# Patient Record
Sex: MALE | Race: WHITE | NOT HISPANIC OR LATINO
[De-identification: names, ages, dates, MRNs, and addresses within clinical notes are randomized per-mention and may not be internally consistent; named-entity substitution may affect disease eponyms.]

---

## 2019-12-26 ENCOUNTER — APPOINTMENT (OUTPATIENT)
Dept: ORTHOPEDIC SURGERY | Facility: CLINIC | Age: 59
End: 2019-12-26
Payer: COMMERCIAL

## 2019-12-26 VITALS — WEIGHT: 188 LBS | BODY MASS INDEX: 27.85 KG/M2 | HEIGHT: 69 IN

## 2019-12-26 DIAGNOSIS — Z78.9 OTHER SPECIFIED HEALTH STATUS: ICD-10-CM

## 2019-12-26 DIAGNOSIS — S49.92XA UNSPECIFIED INJURY OF LEFT SHOULDER AND UPPER ARM, INITIAL ENCOUNTER: ICD-10-CM

## 2019-12-26 DIAGNOSIS — Z82.61 FAMILY HISTORY OF ARTHRITIS: ICD-10-CM

## 2019-12-26 DIAGNOSIS — Z80.9 FAMILY HISTORY OF MALIGNANT NEOPLASM, UNSPECIFIED: ICD-10-CM

## 2019-12-26 PROCEDURE — 73030 X-RAY EXAM OF SHOULDER: CPT | Mod: LT

## 2019-12-26 PROCEDURE — 99215 OFFICE O/P EST HI 40 MIN: CPT | Mod: 25

## 2019-12-26 PROCEDURE — 20610 DRAIN/INJ JOINT/BURSA W/O US: CPT | Mod: LT

## 2019-12-26 RX ORDER — BUPROPION HYDROCHLORIDE 300 MG/1
300 TABLET, EXTENDED RELEASE ORAL
Refills: 0 | Status: ACTIVE | COMMUNITY

## 2019-12-26 RX ORDER — BUPROPION HYDROCHLORIDE 300 MG/1
300 TABLET, EXTENDED RELEASE ORAL
Qty: 90 | Refills: 0 | Status: ACTIVE | COMMUNITY
Start: 2019-10-28

## 2019-12-26 NOTE — ASSESSMENT
[FreeTextEntry1] : 59 year old male with left shoulder injury after a fall down the stairs.  He may have subluxed his shoulder at that time.  It is likely he has a rotator cuff injury with significant weakness on exam.  He was given a corticosteroid injection.   He will remain in the sling.  He will obtain a MRI of his left shoulder.  He can take anti-inflammatories as needed for pain.  He will followup once his MRI has been completed.  He knows to call with any questions or concerns or if his symptoms acutely worsen.

## 2019-12-26 NOTE — HISTORY OF PRESENT ILLNESS
[de-identified] : 59 year old male had a mechanical fall down his apartment stairs on 12/25.  He had left shoulder pain and weakness.  He is unable to lift his left arm up without significant pain or weakness.  He had radiographs at [facility] which were negative for fracture.  He is unsure if he felt his shoulder dislocation. He had one injury to his left shoulder in the past. 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  He had no symptoms in his left shoulder up until this latest injury.

## 2020-01-10 ENCOUNTER — APPOINTMENT (OUTPATIENT)
Dept: ORTHOPEDIC SURGERY | Facility: CLINIC | Age: 60
End: 2020-01-10
Payer: COMMERCIAL

## 2020-01-10 VITALS — WEIGHT: 188 LBS | HEIGHT: 69 IN | BODY MASS INDEX: 27.85 KG/M2

## 2020-01-10 DIAGNOSIS — S53.402A UNSPECIFIED SPRAIN OF LEFT ELBOW, INITIAL ENCOUNTER: ICD-10-CM

## 2020-01-10 DIAGNOSIS — S50.02XA CONTUSION OF LEFT ELBOW, INITIAL ENCOUNTER: ICD-10-CM

## 2020-01-10 PROCEDURE — 99214 OFFICE O/P EST MOD 30 MIN: CPT

## 2020-01-10 PROCEDURE — 73070 X-RAY EXAM OF ELBOW: CPT | Mod: LT

## 2020-01-10 NOTE — PHYSICAL EXAM
[de-identified] : XRAY LEFT ELBOW  - \par AVULSION OF CORONOID PROCESS C/W ANTERIOR CAPSULAR SPRAIN \par NO OBVIOUS FRACTURE OR DISLOCATION  [de-identified] : PHYSICAL EXAM LEFT  SHOULDER - RHD\par \par SCAPULAR PROTRACTION\par AROM 30 / 30\par TENDER: SA REGION\par \par SPECIAL TESTING :\par OSCAR - POSITIVE \par LAWANDA - POSITIVE \par SPEED TEST - POSITIVE\par \par NICOLE - NEGATIVE \par APPREHENSION AND SUPPRESSION - NEGATIVE \par \par RC STRENGTH TESTING \par SS:  5/5\par SUB 5/5\par IS     5/5\par BICEPS  5/5\par \par SENSATION  - GROSSLY INTACT\par \par \par

## 2020-01-10 NOTE — HISTORY OF PRESENT ILLNESS
[de-identified] : LEFT SHOULDER\par 12/25/19-FALL\par P.T. IN SLING\par CANNOT FEEL PAIN\par WORSE WITH LIFTING REACHING\par BETTER WITH SLING,  NOT MOVING\par TINGLING IN FINGERS

## 2020-01-10 NOTE — HISTORY OF PRESENT ILLNESS
[de-identified] : LEFT SHOULDER\par 12/25/19-FALL\par P.T. IN SLING\par CANNOT FEEL PAIN\par WORSE WITH LIFTING REACHING\par BETTER WITH SLING,  NOT MOVING\par TINGLING IN FINGERS

## 2020-01-10 NOTE — PHYSICAL EXAM
[de-identified] : XRAY LEFT ELBOW  - \par AVULSION OF CORONOID PROCESS C/W ANTERIOR CAPSULAR SPRAIN \par NO OBVIOUS FRACTURE OR DISLOCATION  [de-identified] : PHYSICAL EXAM LEFT  SHOULDER - RHD\par \par SCAPULAR PROTRACTION\par AROM 30 / 30\par TENDER: SA REGION\par \par SPECIAL TESTING :\par OSCAR - POSITIVE \par LAWANDA - POSITIVE \par SPEED TEST - POSITIVE\par \par NICOLE - NEGATIVE \par APPREHENSION AND SUPPRESSION - NEGATIVE \par \par RC STRENGTH TESTING \par SS:  5/5\par SUB 5/5\par IS     5/5\par BICEPS  5/5\par \par SENSATION  - GROSSLY INTACT\par \par \par

## 2020-01-17 ENCOUNTER — APPOINTMENT (OUTPATIENT)
Dept: ORTHOPEDIC SURGERY | Facility: CLINIC | Age: 60
End: 2020-01-17
Payer: COMMERCIAL

## 2020-01-17 VITALS — WEIGHT: 188 LBS | BODY MASS INDEX: 27.85 KG/M2 | HEIGHT: 69 IN

## 2020-01-17 PROCEDURE — 99213 OFFICE O/P EST LOW 20 MIN: CPT

## 2020-01-17 RX ORDER — CLOTRIMAZOLE AND BETAMETHASONE DIPROPIONATE 10; .5 MG/G; MG/G
1-0.05 CREAM TOPICAL TWICE DAILY
Qty: 1 | Refills: 2 | Status: ACTIVE | COMMUNITY
Start: 2020-01-17 | End: 1900-01-01

## 2020-01-17 RX ORDER — OXYCODONE AND ACETAMINOPHEN 7.5; 325 MG/1; MG/1
7.5-325 TABLET ORAL
Qty: 42 | Refills: 0 | Status: ACTIVE | COMMUNITY
Start: 2020-01-17 | End: 1900-01-01

## 2020-01-17 NOTE — DISCUSSION/SUMMARY
[de-identified] : PREOP SHOULDER SURGERY DISCUSSION:\par \par \par THERE ARE NO GUARANTEES THAT ALL SYMPTOMS WILL BE ALLEVIATED  \par SHOULDER ARTHROSCOPY, ACROMIOPLASTY, DEBRIDEMENT,  RC REPAIR AND LABRUM REPAIRS- ON AVERAGE 75- 85% SATISFACTORY RESULTS FOR TEARS < 3CM AFTER 9-12 MONTHS HEALING AND REHABILITATION. \par \par REPAIRS WILL REQUIRE STRICT SHOULDER IMMOBILIZER 4-6 WEEKS\par \par RC TEARS 3CM OR LARGER MAY REQUIRE COLLAGEN PATCH AUGMENTATION GENERALLY HAVE LESS SATISFACTORY RESULTS\par \par PHYSICAL THERAPY REQUIRED 2X WEEK FOR  MINIMUM 8-12 WEEKS FOR ALL PROCEDURES \par CONTINUED HOME EXERCISES 6-9 MONTHS AFTER THAT REQUIRED FOR OPTIMAL OUTCOMES \par \par ROUTINE SURGICAL AND ANESTHETIC RISKS INCLUDE RISK OF SURGICAL INFECTION, ANESTHETIC COMPLICATION OR ALLERGY, POSSIBLE RETEARS OR PROGRESSION OF TEAR, STIFFNESS OF SHOULDER AND UNSATISFACTORY OUTCOMES\par \par PATIENT UNDERSTANDS AND WISHES TO PROCEED\par

## 2020-01-17 NOTE — PHYSICAL EXAM
[de-identified] : PHYSICAL EXAM LEFT  SHOULDER - RHD\par \par SCAPULAR PROTRACTION\par AROM 30 / 30\par TENDER: SA REGION\par \par SPECIAL TESTING :\par OSCAR - POSITIVE \par LAWANDA - POSITIVE \par SPEED TEST - POSITIVE\par \par NICOLE - NEGATIVE \par APPREHENSION AND SUPPRESSION - NEGATIVE \par \par RC STRENGTH TESTING \par SS:  5/5\par SUB 5/5\par IS     5/5\par BICEPS  5/5\par \par SENSATION  - GROSSLY INTACT\par \par \par

## 2020-02-04 ENCOUNTER — APPOINTMENT (OUTPATIENT)
Dept: ORTHOPEDIC SURGERY | Facility: AMBULATORY SURGERY CENTER | Age: 60
End: 2020-02-04
Payer: COMMERCIAL

## 2020-02-04 PROCEDURE — 29826 SHO ARTHRS SRG DECOMPRESSION: CPT | Mod: LT,59

## 2020-02-04 PROCEDURE — 15777 ACELLULAR DERM MATRIX IMPLT: CPT | Mod: LT,59

## 2020-02-04 PROCEDURE — 29825 SHO ARTHRS SRG LSS&RESCJ ADS: CPT | Mod: LT,59

## 2020-02-04 PROCEDURE — 29823 SHO ARTHRS SRG XTNSV DBRDMT: CPT | Mod: LT,59

## 2020-02-04 PROCEDURE — 29820 SHO ARTHRS SRG PRTL SYNVCT: CPT | Mod: LT,59

## 2020-02-04 PROCEDURE — 29827 SHO ARTHRS SRG RT8TR CUF RPR: CPT | Mod: LT

## 2020-02-07 ENCOUNTER — APPOINTMENT (OUTPATIENT)
Dept: ORTHOPEDIC SURGERY | Facility: CLINIC | Age: 60
End: 2020-02-07
Payer: COMMERCIAL

## 2020-02-07 VITALS — HEIGHT: 69 IN | BODY MASS INDEX: 27.85 KG/M2 | WEIGHT: 188 LBS

## 2020-02-07 PROCEDURE — 73030 X-RAY EXAM OF SHOULDER: CPT | Mod: LT

## 2020-02-07 PROCEDURE — 99024 POSTOP FOLLOW-UP VISIT: CPT

## 2020-02-07 NOTE — HISTORY OF PRESENT ILLNESS
[de-identified] : FOLLOW UP\par POST OP\par LEFT SHOULDER\par FEBRUARY 4 2020 - 4CM RC REPAIR , REGENETEN\par NO PAIN\par MILD SWELLING\par GENERAL WEAKNESS\par PT IN SLING

## 2020-02-07 NOTE — PHYSICAL EXAM
[de-identified] : POST OP SHOULDER EXAM \par \par PORTALS HEALING WELL - NO ERYTHEMA OR CALOR\par SUTURES REMOVED, STERISTRIPS AND WATERPROOF BANDAIDS  APPLIED \par \par AROM  NT\par \par DISTAL CMS INTACT\par  [de-identified] : LEFT SHOULDER XRAY -  \par NO OBVIOUS FRACTURE OR DISLOCATION, \par SATISFACTORY DECOMPRESSION NOTED  , \par ANCHOR SILHOUETTE VISIBLE IN GREATER TUBEROSITY- SATISFACTORY POSITION\par

## 2020-02-28 ENCOUNTER — APPOINTMENT (OUTPATIENT)
Dept: ORTHOPEDIC SURGERY | Facility: CLINIC | Age: 60
End: 2020-02-28
Payer: COMMERCIAL

## 2020-02-28 PROCEDURE — 99024 POSTOP FOLLOW-UP VISIT: CPT

## 2020-06-04 ENCOUNTER — APPOINTMENT (OUTPATIENT)
Dept: ORTHOPEDIC SURGERY | Facility: CLINIC | Age: 60
End: 2020-06-04
Payer: COMMERCIAL

## 2020-06-04 VITALS — TEMPERATURE: 96.5 F

## 2020-06-04 PROCEDURE — 99213 OFFICE O/P EST LOW 20 MIN: CPT

## 2020-06-04 NOTE — HISTORY OF PRESENT ILLNESS
[de-identified] : PATIENT IS 3 MONTHS POST OP. CAME IN WEARING SLING. HE REPORTS THAT THE KNOB OF THE SHOULDER IS BOTHERING HIM. HE REPORTS PULLING SENSATION WHEN FLEXING THE BICEP. \par \par FEBRUARY 4 2020 - 4CM WINDY CHAN

## 2020-06-04 NOTE — PHYSICAL EXAM
[de-identified] : POST OP LEFT SHOULDER EXAM \par FEBRUARY 4 2020 - LEFT 4CM RC REPAIR , REGENETEN\par \par PORTALS HEALING WELL - NO ERYTHEMA OR CALOR\par \par \par AROM  LEFT -  140  / 140\par \par DISTAL CMS INTACT\par

## 2020-06-04 NOTE — DISCUSSION/SUMMARY
[de-identified] : PT 2 x 4 for strengthening \par Home Exercises Daily  \par Light gym OK\par \par FU 8 weeks -

## 2020-06-04 NOTE — PHYSICAL EXAM
[de-identified] : POST OP LEFT SHOULDER EXAM \par FEBRUARY 4 2020 - LEFT 4CM RC REPAIR , REGENETEN\par \par PORTALS HEALING WELL - NO ERYTHEMA OR CALOR\par \par \par AROM  LEFT -  140  / 140\par \par DISTAL CMS INTACT\par

## 2020-06-04 NOTE — HISTORY OF PRESENT ILLNESS
[de-identified] : LEFT SHOULDER\par FOLLOW UP\par FEBRUARY 4 2020 - 4CM RC REPAIR , REGENETEN\par IMPROVING \par NO PAIN\par GREAT ROM\par PT VERY HELPFUL\par AT HOME EXERCISES\par

## 2020-08-05 ENCOUNTER — APPOINTMENT (OUTPATIENT)
Dept: ORTHOPEDIC SURGERY | Facility: CLINIC | Age: 60
End: 2020-08-05
Payer: COMMERCIAL

## 2020-08-05 VITALS — TEMPERATURE: 97.7 F

## 2020-08-05 PROCEDURE — 99213 OFFICE O/P EST LOW 20 MIN: CPT

## 2020-08-05 NOTE — HISTORY OF PRESENT ILLNESS
[de-identified] : LEFT SHOULDER\par FOLLOW UP\par NO PAIN\par IMPROVING\par FINISHED PT\par FEBRUARY 4 2020 - 4CM MARIBETH REPAIR , WINDY

## 2020-08-05 NOTE — DISCUSSION/SUMMARY
[de-identified] : PT 2 x 4 for strengthening \par Home Exercises Daily  \par Weights and tubing exercises\par \par bicycle OK\par swimmimg OK\par

## 2020-08-05 NOTE — PHYSICAL EXAM
[de-identified] : POST OP LEFT SHOULDER EXAM \par FEBRUARY 4 2020 - LEFT 4CM RC REPAIR , REGENETEN\par \par PORTALS HEALING WELL - NO ERYTHEMA OR CALOR\par \par \par AROM  LEFT -  150 / 150  / 80 / 30\par \par DISTAL CMS INTACT\par

## 2022-11-14 ENCOUNTER — NON-APPOINTMENT (OUTPATIENT)
Age: 62
End: 2022-11-14

## 2023-01-25 ENCOUNTER — APPOINTMENT (OUTPATIENT)
Dept: ORTHOPEDIC SURGERY | Facility: CLINIC | Age: 63
End: 2023-01-25
Payer: MEDICARE

## 2023-01-25 DIAGNOSIS — M75.41 IMPINGEMENT SYNDROME OF RIGHT SHOULDER: ICD-10-CM

## 2023-01-25 PROCEDURE — 76882 US LMTD JT/FCL EVL NVASC XTR: CPT | Mod: RT,59

## 2023-01-25 PROCEDURE — 99214 OFFICE O/P EST MOD 30 MIN: CPT | Mod: 25

## 2023-01-25 PROCEDURE — 20611 DRAIN/INJ JOINT/BURSA W/US: CPT | Mod: RT

## 2023-01-25 NOTE — HISTORY OF PRESENT ILLNESS
[de-identified] : LOCATION:RIGHT SHOULDER- rhd \par DURATION:2 WEEKS AGO -PT WAS CARRYING GROCERIES \par QUALITY:DULL \par INTERMITTENT \par PAIN LEVEL: 5/10 \par BETTER WITH TIGER BALM, REST \par WORSE WITH OVER HEAD LIFTING, REACHING , LIFTING, LATERAL  \par UNABLE TO CARRY BRIEF CASE, REACHING FOR THINGS \par PRIOR STUDIES: X-RAYS FROM [facility] MD-1/20/23\par \par  FEBRUARY 4 2020 - 4CM WINDY CHAN

## 2023-01-25 NOTE — PROCEDURE
[de-identified] : DIAGNOSTIC ULTRASOUND RIGHT SHOULDER\par \par DIAGNOSTIC SONOGRAPHY of the Rotator Cuff Soft Tissue of the RIGHT SHOULDER was performed in Multiple Scan Planes with varying transducer frequencies.\par Imaging of the Supraspinatus Tendon reveals TENDONITIS, BURSITIS, ARTICULAR SIDE DEGENERATION  WITH OUT SIGNIFICANT / COMPLETE TEAR\par Imaging of the Biceps Tendon reveals no significant tear.\par Imaging of the Subscapularis Tendon reveals no significant tear.\par Imaging of the Infraspinatus Tendon reveals no significant tear.\par Key images were save digitally and reviewed with patient.\par \par \par INJECTION RIGHT SHOULDER SA SPACE\par \par Patient has demonstrated limited relief from NSAIDS, rest, exercises / PT, and after discussion of the risks and benefits, the patient has elected to proceed with an ULTRASOUND GUIDED injection into the RIGHT SUBACROMIAL  SPACE LATERAL APPROACH \par  \par Confirmed that the patient does not have history of prior adverse reactions, active, infections, or relevant allergies. There was no effusion, erythema, or warmth, and the skin was clear\par \par The skin was sterilized with alcohol. Ethyl Chloride was used as a topical anesthetic. Routine sterile technique. \par The site was injected UTILIZING ULTRASOUND GUIDANCE to confirm appropriate placement of the needle-\par with a mixture of medication and local anesthetic. The injection was completed without complication and a bandage was applied.\par  \par The patient tolerated the procedure well and was given post-injection instructions.Rec: Cold therapy, analgesics, avoid heavy activity.\par MEDICATION: 4cc of 1% xylocaine + 40mg of KENALOG\par \par

## 2023-01-25 NOTE — PHYSICAL EXAM
[de-identified] : PHYSICAL EXAM  RIGHT  SHOULDER - rhd\par \par NORMAL POSTURE \par AROM 120 / 120 / 70 / 15\par TENDER: SA REGION LATERAL \par \par SPECIAL TESTING :\par OSCAR - POSITIVE \par LAWANDA - POSITIVE \par SPEED TEST - POSITIVE\par \par NICOLE - NEGATIVE \par APPREHENSION AND SUPPRESSION - NEGATIVE \par \par RC STRENGTH TESTING \par SS:  5/5\par SUB 5/5\par IS     5/5\par BICEPS  5/5\par \par SENSATION  - GROSSLY INTACT\par \par \par

## 2023-04-17 ENCOUNTER — APPOINTMENT (OUTPATIENT)
Dept: ORTHOPEDIC SURGERY | Facility: CLINIC | Age: 63
End: 2023-04-17
Payer: COMMERCIAL

## 2023-04-17 PROCEDURE — 20611 DRAIN/INJ JOINT/BURSA W/US: CPT | Mod: RT

## 2023-04-17 PROCEDURE — 99214 OFFICE O/P EST MOD 30 MIN: CPT | Mod: 25

## 2023-04-17 RX ORDER — CELECOXIB 200 MG/1
200 CAPSULE ORAL
Qty: 30 | Refills: 0 | Status: ACTIVE | COMMUNITY
Start: 2023-04-17 | End: 1900-01-01

## 2023-04-17 NOTE — PHYSICAL EXAM
[de-identified] : Right knee has a moderate effusion range of motion is 5 to 90 degrees limited by discomfort.  Patient has a large effusion noted.  Neurovascular intact distally. [de-identified] : Patient I reviewed his radiographs today.  X-rays of the patient's right knee were obtained showing severe narrowing of patellofemoral articulation on the sunrise view the tibiofemoral articulations well-maintained.\par \par

## 2023-04-17 NOTE — HISTORY OF PRESENT ILLNESS
[de-identified] : darius is a first time patient of Dr. Farrell, s/p fall on March 31, onto the knee while carrying groceries. Patient has also injured his shoulder at the time of the fall which has subjectivley gotten better. today, he is here to follow up on the right knee and has been experiencing swelling as well as pain, sharp in nature 8/10. Patient is not ambulating with a limp, and the knee has become progressivley more painful. Patient does not take any medication for the pain.

## 2023-04-17 NOTE — DISCUSSION/SUMMARY
[Medication Risks Reviewed] : Medication risks reviewed [de-identified] : Patient I discussed at length the underlying etiology of his right knee effusion.  He has a posttraumatic effusion which was a source of irritation which may have exacerbated his underlying right knee patellofemoral arthritis.  Talked at length about how he should improve with the cortisone injection knee aspiration we also placed on Celebrex 2 mg p.o. twice daily for 5-day course in particular after as needed.  The reason risk benefits of medication were discussed in detail including potential side effects.  We reviewed his current medication profile appears to be no contraindication to its current intermittent use.\par \par Today's consultation lasted 50 minutes

## 2023-04-17 NOTE — PROCEDURE
[de-identified] : LIDOCAINE\par HIKMA [employer]\par NDC [id number]\par LOT #[id number]\par EXP 12/2024\par 1% 500MG/50ML\par \par KENALOG-10\par [employer]\par NDC [id number]\par LOT # [id number]\par EXP aug 2024\par 50MG/5ML\par \par Patient had the right knee aspirated yielding approximate 40 cc of clear straw-colored fluid.  Through the same needle patient was given a cortisone injection.  This is done under sterile conditions.\par

## 2023-04-26 ENCOUNTER — APPOINTMENT (OUTPATIENT)
Dept: ORTHOPEDIC SURGERY | Facility: CLINIC | Age: 63
End: 2023-04-26
Payer: COMMERCIAL

## 2023-04-26 DIAGNOSIS — M75.81 OTHER SHOULDER LESIONS, RIGHT SHOULDER: ICD-10-CM

## 2023-04-26 PROCEDURE — 73030 X-RAY EXAM OF SHOULDER: CPT | Mod: RT

## 2023-04-26 PROCEDURE — 20611 DRAIN/INJ JOINT/BURSA W/US: CPT | Mod: RT

## 2023-04-26 PROCEDURE — 99213 OFFICE O/P EST LOW 20 MIN: CPT | Mod: 25

## 2023-04-26 NOTE — DISCUSSION/SUMMARY
[de-identified] : \par PATIENT HAS ELECTED TO PROCEED WITH KENALOG INJECTION SHOULDER \par RISKS AND BENEFITS DISCUSSED - VERBAL CONSENT OBTAINED \par SEE PROCEDURE NOTE\par \par \par POST INJECTION INSTRUCTIONS:\par \par INJECTION THERAPY HANDOUT PROVIDED\par \par COLD THERAPY , ANALGESICS PRN\par \par HOME  EXERCISES QD - PENDULUM AND ROM  HANDOUT PROVIDED, REVIEWED AND DEMONSTRATED - REFERRED TO INSTRUCTIONAL VIDEO ON MY WEBSITE\par \par \par MRI ROTATOR CUFF INJURY \par

## 2023-04-26 NOTE — PHYSICAL EXAM
[de-identified] : PHYSICAL EXAM  RIGHT  SHOULDER - rhd\par \par NORMAL POSTURE \par AROM 140 / 140 / 70 / 15\par TENDER: SA REGION LATERAL \par \par SPECIAL TESTING :\par OSCAR - POSITIVE \par LAWANDA - POSITIVE \par SPEED TEST - POSITIVE\par \par NICOLE - NEGATIVE \par APPREHENSION AND SUPPRESSION - NEGATIVE \par \par RC STRENGTH TESTING \par SS:  5/5\par SUB 5/5\par IS     5/5\par BICEPS  5/5\par \par SENSATION  - GROSSLY INTACT\par \par \par   [de-identified] : RIGHT SHOULDER XRAY (2 VIEWS - AP AND OUTLET) -  \par NO OBVIOUS FRACTURE ,  SEPARATION OR DISLOCATION \par NO SIGNIFICANT OSTEOARTHRITIS,\par TYPE 2B ACROMION \par CSA=\par

## 2023-04-26 NOTE — PROCEDURE
[de-identified] : INJECTION RIGHT SHOULDER SA SPACE\par \par Patient has demonstrated limited relief from NSAIDS, rest, exercises / PT, and after discussion of the risks and benefits, the patient has elected to proceed with an ULTRASOUND GUIDED injection into the RIGHT SUBACROMIAL  SPACE LATERAL APPROACH \par  \par Confirmed that the patient does not have history of prior adverse reactions, active, infections, or relevant allergies. There was no effusion, erythema, or warmth, and the skin was clear\par \par The skin was sterilized with alcohol. Ethyl Chloride was used as a topical anesthetic. Routine sterile technique. \par The site was injected UTILIZING ULTRASOUND GUIDANCE to confirm appropriate placement of the needle-\par with a mixture of medication and local anesthetic. The injection was completed without complication and a bandage was applied.\par  \par The patient tolerated the procedure well and was given post-injection instructions.Rec: Cold therapy, analgesics, avoid heavy activity.\par MEDICATION: 4cc of 1% xylocaine + 40mg of KENALOG\par \par

## 2023-04-26 NOTE — HISTORY OF PRESENT ILLNESS
[de-identified] : RIGHT SHOULDER PAIN \par PATIENT WENT TO PHYSICAL THERPAY- 6 SESSIONS \par NEW INJURY: FEB 15, 2023  -PATIENT TRIPPED AND FELL  IN HIS BUILDING \par SECOND INJURY : MARCH 31, 2023- PT FELL IN THE STREETS- PT SAW DR. HENSLEY AND DRAINED HIS RIGHT KNEE \par JAN 25, 2023- CORTISONE INJECTION - HELPFUL \par PAIN LEVEL: 3/10 \par \par \par \par PREVIOUS HPI\par LOCATION:RIGHT SHOULDER- rhd \par DURATION:2 WEEKS AGO -PT WAS CARRYING GROCERIES \par QUALITY:DULL \par INTERMITTENT \par PAIN LEVEL: 5/10 \par BETTER WITH TIGER BALM, REST \par WORSE WITH OVER HEAD LIFTING, REACHING , LIFTING, LATERAL \par UNABLE TO CARRY BRIEF CASE, REACHING FOR THINGS \par PRIOR STUDIES: X-RAYS FROM [facility] MD-1/20/23\par \par  FEBRUARY 4 2020 - 4CM WINDY CHAN

## 2023-05-15 ENCOUNTER — APPOINTMENT (OUTPATIENT)
Dept: ORTHOPEDIC SURGERY | Facility: CLINIC | Age: 63
End: 2023-05-15
Payer: COMMERCIAL

## 2023-05-15 PROCEDURE — 99213 OFFICE O/P EST LOW 20 MIN: CPT

## 2023-05-15 NOTE — PHYSICAL EXAM
[de-identified] : PHYSICAL EXAM  RIGHT  SHOULDER - rhd\par \par NORMAL POSTURE \par AROM 140 / 140 / 70 / 15\par TENDER: SA REGION LATERAL \par \par SPECIAL TESTING :\par OSCAR - POSITIVE \par LAWANDA - POSITIVE \par SPEED TEST - POSITIVE\par \par NICOLE - NEGATIVE \par APPREHENSION AND SUPPRESSION - NEGATIVE \par \par RC STRENGTH TESTING \par SS:  5/5\par SUB 5/5\par IS     5/5\par BICEPS  5/5\par \par SENSATION  - GROSSLY INTACT\par \par \par   [de-identified] : Date of Exam: 05-\par  \par EXAM:  MRI RIGHT SHOULDER WITHOUT CONTRAST\par \par IMPRESSION:  MRI of the right shoulder demonstrates:\par \par 1.  Chronic full-thickness tear involves the entire width of the supraspinatus tendon with proximal tendon retraction measuring up to 3.4 cm. Background severe supraspinatus tendinosis. Moderate infraspinatus tendinosis. Small high-grade articular surface partial tear of the superior distal subscapularis tendon superimposed on moderate tendinosis. Mild to moderate supraspinatus muscle atrophy.\par 2.  Long head of the biceps tendinosis, partially subluxed medially subluxation of the bicipital groove. \par 3.  Findings compatible with adhesive capsulitis.\par 4.  Small subacromial subdeltoid bursitis. \par 5.  Mild to moderate osteoarthritis of the AC joint. \par \par

## 2023-05-15 NOTE — HISTORY OF PRESENT ILLNESS
[de-identified] : RIGHT SHOULDER PAIN \par FOLLOW UP\par CORTISONE INJECTION- APRIL 26, 2023- HELPFUL FOR 2-3 DAYS \par MRI RESULTS TODAY \par \par \par PREVIOUS HPI\par LOCATION:RIGHT SHOULDER- rhd \par DURATION:2 WEEKS AGO -PT WAS CARRYING GROCERIES \par QUALITY:DULL \par INTERMITTENT \par PAIN LEVEL: 5/10 \par BETTER WITH TIGER BALM, REST \par WORSE WITH OVER HEAD LIFTING, REACHING , LIFTING, LATERAL \par UNABLE TO CARRY BRIEF CASE, REACHING FOR THINGS \par PRIOR STUDIES: X-RAYS FROM [facility] MD-1/20/23\par PATIENT WENT TO PHYSICAL THERPAY- 6 SESSIONS \par NEW INJURY: FEB 15, 2023 -PATIENT TRIPPED AND FELL IN HIS BUILDING \par SECOND INJURY : MARCH 31, 2023- PT FELL IN THE STREETS- PT SAW DR. HENSLEY AND DRAINED HIS RIGHT KNEE \par JAN 25, 2023- CORTISONE INJECTION - HELPFUL \par \par  FEBRUARY 4 2020 - 4CM WINDY CHAN

## 2023-05-19 ENCOUNTER — APPOINTMENT (OUTPATIENT)
Dept: ORTHOPEDIC SURGERY | Facility: CLINIC | Age: 63
End: 2023-05-19
Payer: COMMERCIAL

## 2023-05-19 DIAGNOSIS — S89.91XA UNSPECIFIED INJURY OF RIGHT LOWER LEG, INITIAL ENCOUNTER: ICD-10-CM

## 2023-05-19 PROCEDURE — 99214 OFFICE O/P EST MOD 30 MIN: CPT

## 2023-05-19 NOTE — PHYSICAL EXAM
[de-identified] : Right knee has a moderate effusion range of motion is 5 to 90 degrees limited by discomfort.  Patient has a large effusion noted.  Neurovascular intact distally.  Patient has discomfort on the medial joint line positive Shen test there is also tenderness with compression of the patellofemoral articulation.

## 2023-05-19 NOTE — HISTORY OF PRESENT ILLNESS
[de-identified] : Patient is here to discuss his right knee MRI.  Patient continues to have both anterior knee pain as well as medial knee pain.  The medial knee pain appears to be sharp and intermittent the anterior pain exists really only when going up and down stairs and getting out of seated positions.  The MRI findings indicate a small flipped displaced medial meniscal tear at the posterior horn body junction.  There is also significant osteoarthritis of the patellofemoral articulation which was noted on the sunrise view of his radiographs.

## 2023-05-19 NOTE — DISCUSSION/SUMMARY
[de-identified] : Patient I reviewed the MRI findings and utilizing a model I was able to show the patient the pertinent anatomy of the knee and how a torn medial meniscus can be intermittent in nature and causes symptoms similar to his complaints.  At this point we talked about the need to consider knee arthroscopic surgery continues to have symptoms related to the mechanical injury of his meniscus.  We talked about typical convalescence expectations.  We also said that the patient may continue to have the anterior knee pain because of the arthritis of the patellofemoral articulation which would not be addressed by the surgery and may require injections and possible intervention such as a patellofemoral replacement in the future.\par \par Patient currently is slated for more urgent shoulder surgery he will follow-up once the patient has recovered from his upcoming rotator cuff surgery.\par \par Today's consultation lasted 35 minutes.

## 2023-05-19 NOTE — REASON FOR VISIT
[Follow-Up Visit] : a follow-up visit for [FreeTextEntry2] : Rt knee pain. MRI review. Pt states the knee was swollen and hot a few days ago but it went down. there is discomfort.

## 2023-06-07 ENCOUNTER — APPOINTMENT (OUTPATIENT)
Dept: ORTHOPEDIC SURGERY | Facility: CLINIC | Age: 63
End: 2023-06-07
Payer: COMMERCIAL

## 2023-06-07 DIAGNOSIS — M75.102 UNSPECIFIED ROTATOR CUFF TEAR OR RUPTURE OF LEFT SHOULDER, NOT SPECIFIED AS TRAUMATIC: ICD-10-CM

## 2023-06-07 PROCEDURE — 99213 OFFICE O/P EST LOW 20 MIN: CPT

## 2023-06-07 RX ORDER — OXYCODONE AND ACETAMINOPHEN 7.5; 325 MG/1; MG/1
7.5-325 TABLET ORAL
Qty: 42 | Refills: 0 | Status: ACTIVE | COMMUNITY
Start: 2023-06-07 | End: 1900-01-01

## 2023-06-07 RX ORDER — ONDANSETRON 8 MG/1
8 TABLET, ORALLY DISINTEGRATING ORAL 3 TIMES DAILY
Qty: 15 | Refills: 4 | Status: ACTIVE | COMMUNITY
Start: 2023-06-07 | End: 1900-01-01

## 2023-06-07 NOTE — PHYSICAL EXAM
[de-identified] : PHYSICAL EXAM  RIGHT  SHOULDER - rhd\par \par NORMAL POSTURE \par AROM 140 / 140 / 70 / 15\par TENDER: SA REGION LATERAL \par \par SPECIAL TESTING :\par OSCAR - POSITIVE \par LAWANDA - POSITIVE \par SPEED TEST - POSITIVE\par \par NICOLE - NEGATIVE \par APPREHENSION AND SUPPRESSION - NEGATIVE \par \par RC STRENGTH TESTING \par SS:  5/5\par SUB 5/5\par IS     5/5\par BICEPS  5/5\par \par SENSATION  - GROSSLY INTACT\par \par \par

## 2023-06-07 NOTE — DISCUSSION/SUMMARY
[de-identified] : \par PROCEDURE DISCUSSED - QUESTIONS ANSWERED\par PATIENT WISHES TO PROCEED\par \par POST OP CARE AND LIMITATIONS REVIEWED - HANDOUT PROVIDED \par \par COLD PACKS RECOMMENDED\par ANALGESICS AND  ANTI NAUSEA MEDS PRESCRIBED \par COLACE RECOMMENDED\par \par \par THERE ARE NO GUARANTEES THAT ALL SYMPTOMS WILL BE ALLEVIATED  \par SHOULDER ARTHROSCOPY, ACROMIOPLASTY, DEBRIDEMENT,  RC REPAIR AND LABRUM REPAIRS- ON AVERAGE 75- 85% SATISFACTORY RESULTS FOR TEARS < 3CM AFTER 9-12 MONTHS HEALING AND REHABILITATION. \par \par REPAIRS WILL REQUIRE STRICT SHOULDER IMMOBILIZER 4-6 WEEKS\par \par RC TEARS 3CM OR LARGER MAY REQUIRE COLLAGEN PATCH AUGMENTATION GENERALLY HAVE LESS SATISFACTORY RESULTS\par \par PHYSICAL THERAPY REQUIRED 2X WEEK FOR  MINIMUM 8-12 WEEKS FOR ALL PROCEDURES \par CONTINUED HOME EXERCISES 6-9 MONTHS AFTER THAT REQUIRED FOR OPTIMAL OUTCOMES \par \par ROUTINE SURGICAL AND ANESTHETIC RISKS INCLUDE RISK OF SURGICAL INFECTION, ANESTHETIC COMPLICATION OR ALLERGY, POSSIBLE RETEARS OR PROGRESSION OF TEAR, STIFFNESS OF SHOULDER AND UNSATISFACTORY OUTCOMES\par \par PATIENT UNDERSTANDS AND WISHES TO PROCEED\par

## 2023-06-13 ENCOUNTER — APPOINTMENT (OUTPATIENT)
Dept: ORTHOPEDIC SURGERY | Facility: HOSPITAL | Age: 63
End: 2023-06-13
Payer: COMMERCIAL

## 2023-06-13 PROCEDURE — 15777 ACELLULAR DERM MATRIX IMPLT: CPT | Mod: RT,59

## 2023-06-13 PROCEDURE — 29820 SHO ARTHRS SRG PRTL SYNVCT: CPT | Mod: RT,59

## 2023-06-13 PROCEDURE — 29823 SHO ARTHRS SRG XTNSV DBRDMT: CPT | Mod: RT,59

## 2023-06-13 PROCEDURE — 29827 SHO ARTHRS SRG RT8TR CUF RPR: CPT | Mod: RT

## 2023-06-13 PROCEDURE — 29825 SHO ARTHRS SRG LSS&RESCJ ADS: CPT | Mod: RT,59

## 2023-06-13 PROCEDURE — 29826 SHO ARTHRS SRG DECOMPRESSION: CPT | Mod: RT,59

## 2023-06-16 ENCOUNTER — APPOINTMENT (OUTPATIENT)
Dept: ORTHOPEDIC SURGERY | Facility: CLINIC | Age: 63
End: 2023-06-16
Payer: COMMERCIAL

## 2023-06-16 PROCEDURE — 99024 POSTOP FOLLOW-UP VISIT: CPT

## 2023-06-16 PROCEDURE — 73030 X-RAY EXAM OF SHOULDER: CPT | Mod: RT

## 2023-06-16 NOTE — PHYSICAL EXAM
[de-identified] : POST OP SHOULDER EXAM \par JUNE 13, 2023- RIGHT 3CM  ROT CUFF REPAIR WITH COLLAGEN PATCH , JACQUELINE, DEBRIDEMENT \par \par PORTALS HEALING WELL - NO ERYTHEMA OR CALOR\par SUTURES REMOVED, STERISTRIPS AND WATERPROOF BANDAIDS  APPLIED \par \par AROM  NT\par \par DISTAL CMS INTACT\par  [de-identified] : RIGHT SHOULDER XRAY (2 VIEWS - AP AND OUTLET) -  \par NO OBVIOUS FRACTURE ,  SEPARATION OR DISLOCATION \par NO SIGNIFICANT OSTEOARTHRITIS,\par TYPE 2B ACROMION \par CSA=\par

## 2023-06-16 NOTE — HISTORY OF PRESENT ILLNESS
[de-identified] : RIGHT SHOULDER PAIN \par FOLLOW UP\par POST OP 3 DAYS \par JUNE 13, 2023- RIGHT 3CM  ROT CUFF REPAIR WITH REGENETEN PATCH , JACQUELINE, DEBRIDEMENT \par  \par \par \par PREVIOUS HPI\par LOCATION:RIGHT SHOULDER- rhd \par DURATION:2 WEEKS AGO -PT WAS CARRYING GROCERIES \par QUALITY:DULL \par INTERMITTENT \par PAIN LEVEL: 5/10 \par BETTER WITH TIGER BALM, REST \par WORSE WITH OVER HEAD LIFTING, REACHING , LIFTING, LATERAL \par UNABLE TO CARRY BRIEF CASE, REACHING FOR THINGS \par PRIOR STUDIES: X-RAYS FROM [facility] MD-1/20/23\par PATIENT WENT TO PHYSICAL THERPAY- 6 SESSIONS \par NEW INJURY: FEB 15, 2023 -PATIENT TRIPPED AND FELL IN HIS BUILDING \par SECOND INJURY : MARCH 31, 2023- PT FELL IN THE STREETS- PT SAW DR. HENSLEY AND DRAINED HIS RIGHT KNEE \par JAN 25, 2023- CORTISONE INJECTION - HELPFUL \par CORTISONE INJECTION- APRIL 26, 2023- HELPFUL FOR 2-3 DAYS \par \par  FEBRUARY 4 2020 - 4CM RC REPAIR , REGENETEN

## 2023-06-16 NOTE — ASSESSMENT
[FreeTextEntry1] : JUNE 13, 2023- RIGHT 3CM  ROT CUFF REPAIR WITH COLLAGEN PATCH , JACQUELINE, DEBRIDEMENT

## 2023-06-16 NOTE — DISCUSSION/SUMMARY
[de-identified] : POST OP REPAIR:\par \par COLD THERAPY,ANALGESICS AS NEEDED\par \par SHOULDER IMMOBILIZED FULL TIME X 3 - 6 WEEKS - STRICT NO AROM - DESKTOP WORK ALLOWED\par \par SCAPULAR SQUEEZES / ROLLS, ELBOW, WRIST, HAND AROM TID \par \par START PENDULUM EXERCISES, EXT ROT  3 WEEK POSTOP\par \par START AAROM FLEXION, PULLEY  5 WEEKS POST OP\par \par 6 WEEKS POSTOP  - ADVANCE TO P.T.  chronic kidney disease, appt within 2 weeks

## 2023-07-07 ENCOUNTER — APPOINTMENT (OUTPATIENT)
Dept: ORTHOPEDIC SURGERY | Facility: CLINIC | Age: 63
End: 2023-07-07
Payer: COMMERCIAL

## 2023-07-07 PROCEDURE — 99024 POSTOP FOLLOW-UP VISIT: CPT

## 2023-07-07 NOTE — DISCUSSION/SUMMARY
[de-identified] : POST OP REPAIR:\par \par COLD THERAPY,ANALGESICS AS NEEDED\par \par SHOULDER IMMOBILIZED FULL TIME X 3 - 6 WEEKS - STRICT NO AROM - DESKTOP WORK ALLOWED\par \par SCAPULAR SQUEEZES / ROLLS, ELBOW, WRIST, HAND AROM TID \par \par START PENDULUM EXERCISES, EXT ROT  3 WEEK POSTOP\par \par START AAROM FLEXION, PULLEY  5 WEEKS POST OP\par \par 6 WEEKS POSTOP  - ADVANCE TO P.T.

## 2023-07-07 NOTE — PHYSICAL EXAM
[de-identified] : POST OP SHOULDER EXAM \par JUNE 13, 2023- RIGHT 3CM  ROT CUFF REPAIR WITH COLLAGEN PATCH , JACQUELINE, DEBRIDEMENT \par \par PORTALS HEALING WELL - NO ERYTHEMA OR CALOR\par \par AROM  90 \par \par DISTAL CMS INTACT\par

## 2023-07-07 NOTE — HISTORY OF PRESENT ILLNESS
[de-identified] : RIGHT SHOULDER PAIN \par FOLLOW UP\par POST OP 3 WEEKS  \par JUNE 13, 2023- RIGHT 3CM  ROT CUFF REPAIR WITH REGENETEN PATCH , JACQUELINE, DEBRIDEMENT \par PAIN LEVEL: \par  \par \par \par PREVIOUS HPI\par LOCATION:RIGHT SHOULDER- rhd \par DURATION:2 WEEKS AGO -PT WAS CARRYING GROCERIES \par QUALITY:DULL \par INTERMITTENT \par PAIN LEVEL: 5/10 \par BETTER WITH TIGER BALM, REST \par WORSE WITH OVER HEAD LIFTING, REACHING , LIFTING, LATERAL \par UNABLE TO CARRY BRIEF CASE, REACHING FOR THINGS \par PRIOR STUDIES: X-RAYS FROM [facility] MD-1/20/23\par PATIENT WENT TO PHYSICAL THERPAY- 6 SESSIONS \par NEW INJURY: FEB 15, 2023 -PATIENT TRIPPED AND FELL IN HIS BUILDING \par SECOND INJURY : MARCH 31, 2023- PT FELL IN THE STREETS- PT SAW DR. HENSLEY AND DRAINED HIS RIGHT KNEE \par JAN 25, 2023- CORTISONE INJECTION - HELPFUL \par CORTISONE INJECTION- APRIL 26, 2023- HELPFUL FOR 2-3 DAYS \par \par  FEBRUARY 4 2020 - 4CM RC REPAIR , REGENETEN

## 2023-09-01 ENCOUNTER — APPOINTMENT (OUTPATIENT)
Dept: ORTHOPEDIC SURGERY | Facility: CLINIC | Age: 63
End: 2023-09-01
Payer: COMMERCIAL

## 2023-09-01 PROCEDURE — 99024 POSTOP FOLLOW-UP VISIT: CPT

## 2023-09-01 NOTE — HISTORY OF PRESENT ILLNESS
[de-identified] : RIGHT SHOULDER PAIN  FOLLOW UP POST OP  10 WEEKS  JUNE 13, 2023- RIGHT 3CM ROT CUFF REPAIR WITH REGENETEN PATCH , JACQUELINE, DEBRIDEMENT  PAIN LEVEL: 3/10  PT IS GOING TO      PREVIOUS HPI LOCATION:RIGHT SHOULDER- rhd  DURATION:2 WEEKS AGO -PT WAS CARRYING GROCERIES  QUALITY:DULL  INTERMITTENT  PAIN LEVEL: 5/10  BETTER WITH TIGER BALM, REST  WORSE WITH OVER HEAD LIFTING, REACHING , LIFTING, LATERAL  UNABLE TO CARRY BRIEF CASE, REACHING FOR THINGS  PRIOR STUDIES: X-RAYS FROM [facility] MD-1/20/23 PATIENT WENT TO PHYSICAL THERPAY- 6 SESSIONS  NEW INJURY: FEB 15, 2023 -PATIENT TRIPPED AND FELL IN HIS BUILDING  SECOND INJURY : MARCH 31, 2023- PT FELL IN THE STREETS- PT SAW DR. HENSLEY AND DRAINED HIS RIGHT KNEE  JAN 25, 2023- CORTISONE INJECTION - HELPFUL  CORTISONE INJECTION- APRIL 26, 2023- HELPFUL FOR 2-3 DAYS    FEBRUARY 4 2020 - 4CM RC REPAIR , REGENETEN

## 2023-09-01 NOTE — PHYSICAL EXAM
[de-identified] : POST OP SHOULDER EXAM  JUNE 13, 2023- RIGHT 3CM  ROT CUFF REPAIR WITH COLLAGEN PATCH , JACQUELINE, DEBRIDEMENT   PORTALS HEALING WELL - NO ERYTHEMA OR CALOR  AROM  155 / 145 / 80 / 20   DISTAL CMS INTACT

## 2023-10-16 ENCOUNTER — INPATIENT (INPATIENT)
Facility: HOSPITAL | Age: 63
LOS: 1 days | Discharge: HOME | DRG: 392 | End: 2023-10-18
Attending: GENERAL ACUTE CARE HOSPITAL | Admitting: GENERAL ACUTE CARE HOSPITAL
Payer: COMMERCIAL

## 2023-10-16 VITALS
OXYGEN SATURATION: 99 % | HEART RATE: 81 BPM | RESPIRATION RATE: 18 BRPM | SYSTOLIC BLOOD PRESSURE: 175 MMHG | DIASTOLIC BLOOD PRESSURE: 114 MMHG | TEMPERATURE: 98 F | HEIGHT: 69 IN | WEIGHT: 184.09 LBS

## 2023-10-16 DIAGNOSIS — Z98.84 BARIATRIC SURGERY STATUS: Chronic | ICD-10-CM

## 2023-10-16 LAB
ALBUMIN SERPL ELPH-MCNC: 4.8 G/DL — SIGNIFICANT CHANGE UP (ref 3.3–5)
ALP SERPL-CCNC: 98 U/L — SIGNIFICANT CHANGE UP (ref 40–120)
ALT FLD-CCNC: 22 U/L — SIGNIFICANT CHANGE UP (ref 10–45)
ANION GAP SERPL CALC-SCNC: 13 MMOL/L — SIGNIFICANT CHANGE UP (ref 5–17)
APPEARANCE UR: CLEAR — SIGNIFICANT CHANGE UP
AST SERPL-CCNC: 30 U/L — SIGNIFICANT CHANGE UP (ref 10–40)
BACTERIA # UR AUTO: PRESENT /HPF
BACTERIA # UR AUTO: PRESENT /HPF
BASE EXCESS BLDV CALC-SCNC: 3.3 MMOL/L — HIGH (ref -2–3)
BASOPHILS # BLD AUTO: 0.05 K/UL — SIGNIFICANT CHANGE UP (ref 0–0.2)
BASOPHILS NFR BLD AUTO: 0.4 % — SIGNIFICANT CHANGE UP (ref 0–2)
BILIRUB SERPL-MCNC: 1.2 MG/DL — SIGNIFICANT CHANGE UP (ref 0.2–1.2)
BILIRUB UR-MCNC: NEGATIVE — SIGNIFICANT CHANGE UP
BUN SERPL-MCNC: 13 MG/DL — SIGNIFICANT CHANGE UP (ref 7–23)
CA-I SERPL-SCNC: 1.3 MMOL/L — SIGNIFICANT CHANGE UP (ref 1.15–1.33)
CALCIUM SERPL-MCNC: 10.6 MG/DL — HIGH (ref 8.4–10.5)
CHLORIDE SERPL-SCNC: 100 MMOL/L — SIGNIFICANT CHANGE UP (ref 96–108)
CO2 BLDV-SCNC: 29.1 MMOL/L — HIGH (ref 22–26)
CO2 SERPL-SCNC: 26 MMOL/L — SIGNIFICANT CHANGE UP (ref 22–31)
COLOR SPEC: YELLOW — SIGNIFICANT CHANGE UP
COMMENT - URINE: SIGNIFICANT CHANGE UP
COMMENT - URINE: SIGNIFICANT CHANGE UP
CREAT SERPL-MCNC: 0.87 MG/DL — SIGNIFICANT CHANGE UP (ref 0.5–1.3)
DIFF PNL FLD: ABNORMAL
EGFR: 98 ML/MIN/1.73M2 — SIGNIFICANT CHANGE UP
EOSINOPHIL # BLD AUTO: 0.12 K/UL — SIGNIFICANT CHANGE UP (ref 0–0.5)
EOSINOPHIL NFR BLD AUTO: 1 % — SIGNIFICANT CHANGE UP (ref 0–6)
EPI CELLS # UR: SIGNIFICANT CHANGE UP /HPF (ref 0–5)
EPI CELLS # UR: SIGNIFICANT CHANGE UP /HPF (ref 0–5)
ETHANOL SERPL-MCNC: <10 MG/DL — SIGNIFICANT CHANGE UP (ref 0–10)
FLUAV AG NPH QL: SIGNIFICANT CHANGE UP
FLUAV AG NPH QL: SIGNIFICANT CHANGE UP
FLUBV AG NPH QL: SIGNIFICANT CHANGE UP
FLUBV AG NPH QL: SIGNIFICANT CHANGE UP
GAS PNL BLDV: 133 MMOL/L — LOW (ref 136–145)
GAS PNL BLDV: SIGNIFICANT CHANGE UP
GLUCOSE SERPL-MCNC: 114 MG/DL — HIGH (ref 70–99)
GLUCOSE UR QL: NEGATIVE — SIGNIFICANT CHANGE UP
HCO3 BLDV-SCNC: 28 MMOL/L — SIGNIFICANT CHANGE UP (ref 22–29)
HCT VFR BLD CALC: 45.3 % — SIGNIFICANT CHANGE UP (ref 39–50)
HGB BLD-MCNC: 16.1 G/DL — SIGNIFICANT CHANGE UP (ref 13–17)
IMM GRANULOCYTES NFR BLD AUTO: 0.3 % — SIGNIFICANT CHANGE UP (ref 0–0.9)
KETONES UR-MCNC: NEGATIVE — SIGNIFICANT CHANGE UP
LACTATE SERPL-SCNC: 1.6 MMOL/L — SIGNIFICANT CHANGE UP (ref 0.5–2)
LEUKOCYTE ESTERASE UR-ACNC: NEGATIVE — SIGNIFICANT CHANGE UP
LIDOCAIN IGE QN: 70 U/L — HIGH (ref 7–60)
LYMPHOCYTES # BLD AUTO: 1.05 K/UL — SIGNIFICANT CHANGE UP (ref 1–3.3)
LYMPHOCYTES # BLD AUTO: 9 % — LOW (ref 13–44)
MCHC RBC-ENTMCNC: 31.9 PG — SIGNIFICANT CHANGE UP (ref 27–34)
MCHC RBC-ENTMCNC: 35.5 GM/DL — SIGNIFICANT CHANGE UP (ref 32–36)
MCV RBC AUTO: 89.9 FL — SIGNIFICANT CHANGE UP (ref 80–100)
MONOCYTES # BLD AUTO: 0.84 K/UL — SIGNIFICANT CHANGE UP (ref 0–0.9)
MONOCYTES NFR BLD AUTO: 7.2 % — SIGNIFICANT CHANGE UP (ref 2–14)
NEUTROPHILS # BLD AUTO: 9.56 K/UL — HIGH (ref 1.8–7.4)
NEUTROPHILS NFR BLD AUTO: 82.1 % — HIGH (ref 43–77)
NITRITE UR-MCNC: NEGATIVE — SIGNIFICANT CHANGE UP
NRBC # BLD: 0 /100 WBCS — SIGNIFICANT CHANGE UP (ref 0–0)
PCO2 BLDV: 41 MMHG — LOW (ref 42–55)
PH BLDV: 7.44 — HIGH (ref 7.32–7.43)
PH UR: 6.5 — SIGNIFICANT CHANGE UP (ref 5–8)
PLATELET # BLD AUTO: 357 K/UL — SIGNIFICANT CHANGE UP (ref 150–400)
PO2 BLDV: 65 MMHG — HIGH (ref 25–45)
POTASSIUM BLDV-SCNC: 3.7 MMOL/L — SIGNIFICANT CHANGE UP (ref 3.5–5.1)
POTASSIUM SERPL-MCNC: 3.6 MMOL/L — SIGNIFICANT CHANGE UP (ref 3.5–5.3)
POTASSIUM SERPL-SCNC: 3.6 MMOL/L — SIGNIFICANT CHANGE UP (ref 3.5–5.3)
PROT SERPL-MCNC: 7.5 G/DL — SIGNIFICANT CHANGE UP (ref 6–8.3)
PROT UR-MCNC: NEGATIVE MG/DL — SIGNIFICANT CHANGE UP
RBC # BLD: 5.04 M/UL — SIGNIFICANT CHANGE UP (ref 4.2–5.8)
RBC # FLD: 12.7 % — SIGNIFICANT CHANGE UP (ref 10.3–14.5)
RBC CASTS # UR COMP ASSIST: < 5 /HPF — SIGNIFICANT CHANGE UP
RBC CASTS # UR COMP ASSIST: < 5 /HPF — SIGNIFICANT CHANGE UP
RSV RNA NPH QL NAA+NON-PROBE: SIGNIFICANT CHANGE UP
RSV RNA NPH QL NAA+NON-PROBE: SIGNIFICANT CHANGE UP
SAO2 % BLDV: 93.2 % — HIGH (ref 67–88)
SARS-COV-2 RNA SPEC QL NAA+PROBE: SIGNIFICANT CHANGE UP
SARS-COV-2 RNA SPEC QL NAA+PROBE: SIGNIFICANT CHANGE UP
SODIUM SERPL-SCNC: 139 MMOL/L — SIGNIFICANT CHANGE UP (ref 135–145)
SP GR SPEC: 1.01 — SIGNIFICANT CHANGE UP (ref 1–1.03)
UROBILINOGEN FLD QL: 0.2 E.U./DL — SIGNIFICANT CHANGE UP
WBC # BLD: 11.65 K/UL — HIGH (ref 3.8–10.5)
WBC # FLD AUTO: 11.65 K/UL — HIGH (ref 3.8–10.5)
WBC UR QL: < 5 /HPF — SIGNIFICANT CHANGE UP
WBC UR QL: < 5 /HPF — SIGNIFICANT CHANGE UP

## 2023-10-16 PROCEDURE — 74177 CT ABD & PELVIS W/CONTRAST: CPT | Mod: 26,MA

## 2023-10-16 PROCEDURE — 93010 ELECTROCARDIOGRAM REPORT: CPT

## 2023-10-16 PROCEDURE — 99285 EMERGENCY DEPT VISIT HI MDM: CPT

## 2023-10-16 RX ORDER — PANTOPRAZOLE SODIUM 20 MG/1
40 TABLET, DELAYED RELEASE ORAL ONCE
Refills: 0 | Status: COMPLETED | OUTPATIENT
Start: 2023-10-16 | End: 2023-10-16

## 2023-10-16 RX ORDER — SODIUM CHLORIDE 9 MG/ML
1000 INJECTION INTRAMUSCULAR; INTRAVENOUS; SUBCUTANEOUS ONCE
Refills: 0 | Status: COMPLETED | OUTPATIENT
Start: 2023-10-16 | End: 2023-10-16

## 2023-10-16 RX ORDER — ATORVASTATIN CALCIUM 80 MG/1
1 TABLET, FILM COATED ORAL
Refills: 0 | DISCHARGE

## 2023-10-16 RX ORDER — AMLODIPINE BESYLATE 2.5 MG/1
1 TABLET ORAL
Refills: 0 | DISCHARGE

## 2023-10-16 RX ORDER — AMLODIPINE BESYLATE 2.5 MG/1
10 TABLET ORAL DAILY
Refills: 0 | Status: DISCONTINUED | OUTPATIENT
Start: 2023-10-16 | End: 2023-10-18

## 2023-10-16 RX ORDER — SODIUM CHLORIDE 9 MG/ML
1000 INJECTION, SOLUTION INTRAVENOUS
Refills: 0 | Status: DISCONTINUED | OUTPATIENT
Start: 2023-10-16 | End: 2023-10-18

## 2023-10-16 RX ORDER — ATORVASTATIN CALCIUM 80 MG/1
10 TABLET, FILM COATED ORAL AT BEDTIME
Refills: 0 | Status: DISCONTINUED | OUTPATIENT
Start: 2023-10-16 | End: 2023-10-18

## 2023-10-16 RX ORDER — HEPARIN SODIUM 5000 [USP'U]/ML
5000 INJECTION INTRAVENOUS; SUBCUTANEOUS EVERY 8 HOURS
Refills: 0 | Status: DISCONTINUED | OUTPATIENT
Start: 2023-10-16 | End: 2023-10-18

## 2023-10-16 RX ORDER — ONDANSETRON 8 MG/1
4 TABLET, FILM COATED ORAL ONCE
Refills: 0 | Status: DISCONTINUED | OUTPATIENT
Start: 2023-10-16 | End: 2023-10-18

## 2023-10-16 RX ORDER — IOHEXOL 300 MG/ML
30 INJECTION, SOLUTION INTRAVENOUS ONCE
Refills: 0 | Status: COMPLETED | OUTPATIENT
Start: 2023-10-16 | End: 2023-10-16

## 2023-10-16 RX ORDER — AMLODIPINE BESYLATE 2.5 MG/1
10 TABLET ORAL ONCE
Refills: 0 | Status: COMPLETED | OUTPATIENT
Start: 2023-10-16 | End: 2023-10-16

## 2023-10-16 RX ORDER — BUPROPION HYDROCHLORIDE 150 MG/1
300 TABLET, EXTENDED RELEASE ORAL DAILY
Refills: 0 | Status: DISCONTINUED | OUTPATIENT
Start: 2023-10-16 | End: 2023-10-18

## 2023-10-16 RX ORDER — ONDANSETRON 8 MG/1
4 TABLET, FILM COATED ORAL ONCE
Refills: 0 | Status: COMPLETED | OUTPATIENT
Start: 2023-10-16 | End: 2023-10-16

## 2023-10-16 RX ADMIN — ONDANSETRON 4 MILLIGRAM(S): 8 TABLET, FILM COATED ORAL at 14:31

## 2023-10-16 RX ADMIN — SODIUM CHLORIDE 1000 MILLILITER(S): 9 INJECTION INTRAMUSCULAR; INTRAVENOUS; SUBCUTANEOUS at 14:32

## 2023-10-16 RX ADMIN — ATORVASTATIN CALCIUM 10 MILLIGRAM(S): 80 TABLET, FILM COATED ORAL at 22:08

## 2023-10-16 RX ADMIN — PANTOPRAZOLE SODIUM 40 MILLIGRAM(S): 20 TABLET, DELAYED RELEASE ORAL at 14:32

## 2023-10-16 RX ADMIN — AMLODIPINE BESYLATE 10 MILLIGRAM(S): 2.5 TABLET ORAL at 22:07

## 2023-10-16 RX ADMIN — AMLODIPINE BESYLATE 10 MILLIGRAM(S): 2.5 TABLET ORAL at 15:24

## 2023-10-16 RX ADMIN — IOHEXOL 30 MILLILITER(S): 300 INJECTION, SOLUTION INTRAVENOUS at 14:54

## 2023-10-16 RX ADMIN — HEPARIN SODIUM 5000 UNIT(S): 5000 INJECTION INTRAVENOUS; SUBCUTANEOUS at 23:30

## 2023-10-16 RX ADMIN — Medication 50 MILLIGRAM(S): at 14:54

## 2023-10-16 RX ADMIN — SODIUM CHLORIDE 1000 MILLILITER(S): 9 INJECTION INTRAMUSCULAR; INTRAVENOUS; SUBCUTANEOUS at 16:11

## 2023-10-16 NOTE — ED ADULT TRIAGE NOTE - BP NONINVASIVE DIASTOLIC (MM HG)
Procedures   Fibroscan Procedure     Name: Jorge Sharp  Date of Procedure : 2017   :: Jenise Miller NP  Diagnosis: NAFLD  Probe: XL    Fibroscan readin.7 KPa    IQR/med:20 %    Fibrosis:F2         
114

## 2023-10-16 NOTE — ED PROVIDER NOTE - OBJECTIVE STATEMENT
Pt w/ PMHx HTN on Amlodipine 10 mg QD, HLD on Atorvastatin, Depression / anxiety on Bupropion, PShx lap band 2005 ([place]), abd wall surgery s/p wound p/w 2 day of abd pain, n/v/d. He states sx began 1 am Sun morning w/ abd distention and diffuse abd pain. He reports multiple episodes of nonbilious emesis including on arrival here to the ED. He states his last BM was 3 days ago on 10/13, he normally does daily. His first BM was here, pure water and yellow in color. He has been having acid reflux, and a lot of belching, is not passing gas from below. In the past 3 months he has been drinking a bottle on moonshine per week, and in the 3 days leading up to this illness, he was drinking 3 drinks per day. He states he last drank Sat night prior to the onset of these sx. He denies hx alcohol w/d sx, tremors, seizures, DTs.  Last EGD approx 10 years ago, negative. Gets frequent C-scopes 2/2 + FHx, polyps only (excised) w/ most recent in the past 1-2 years.   Denies substance abuse.

## 2023-10-16 NOTE — ED ADULT NURSE NOTE - OBJECTIVE STATEMENT
Patient presents to the ED complaining of abdominal pain, vomiting, nausea and abdominal bloating since saturday. Patient reports history of Lap band 2005. Patient states that he ate a large meal on saturday and had a beer. Denies any blood in stool.

## 2023-10-16 NOTE — ED PROVIDER NOTE - CARE PLAN
1 Principal Discharge DX:	Abdominal pain  Secondary Diagnosis:	Enteritis  Secondary Diagnosis:	Ileus

## 2023-10-16 NOTE — ED PROVIDER NOTE - PROGRESS NOTE DETAILS
Surgery consulted and will see the pt Pt seen by surgery. Feels more likely reactive ileus to enteritis, rather than SBO. Admit to surgery, regional, Dr Chen, for serial exams. Requested stool studies

## 2023-10-16 NOTE — ED PROVIDER NOTE - PHYSICAL EXAMINATION
Constitutional: Well appearing, awake, alert, oriented to person, place, time/situation and in no apparent distress.  ENMT: Airway patent. Dry MM  Eyes: Clear bilaterally  Cardiac: Normal rate, regular rhythm.  Heart sounds S1, S2.  No murmurs, rubs or gallops.  Respiratory: Breaths sounds equal and clear b/l. No increased WOB, tachypnea, hypoxia, or accessory mm use. Pt speaks in full sentences.   Gastrointestinal: + mild diffuse abd ttp, ND, hypoactive BS. No guarding, rebound, or rigidity. No pulsatile abdominal masses. No organomegaly appreciated.   Musculoskeletal: Range of motion is not limited  Neuro: Alert and oriented x 3, face symmetric and speech fluent. Strength 5/5 x 4 ext and symmetric, nml gross motor movement, nml gait. No focal deficits noted. mildly tremulous  Skin: Skin normal color for race, warm, dry and intact. No evidence of rash.  Psych: Alert and oriented to person, place, time/situation. anxious affect. no apparent risk to self or others.

## 2023-10-16 NOTE — H&P ADULT - HISTORY OF PRESENT ILLNESS
64 yo M w/ PMHx HTN on Amlodipine 10 mg QD, HLD on Atorvastatin, Depression / anxiety on Bupropion, PSHx lap band ([facility] - 2005), [facility] band revision ([facility] - 2016), abdominoplasty, p/f 2 day of upper abd pain with nausea & emesis. Patient reports symptoms started 2 nights ago as burning upper abdominal pain with reflux sxs, and progressed to nausea and multiple episodes of non-bilious emesis. He is having intermittent flatus and had loose stool x 1 today. Patient denied fever/chills     He has had 2 colonoscopies in the last 4 yrs (2019 & 2022) both of which were only positive for 2 excised polyps. Next C-scope scheduled for 2027. Patient unsure of last EGD but believes it was done around time of lap band revision in 2016 and was unremarkable.

## 2023-10-16 NOTE — ED PROVIDER NOTE - CLINICAL SUMMARY MEDICAL DECISION MAKING FREE TEXT BOX
Pt p/w abd pain, n/v, one watery BM, hx lap band. Recent alcohol abuse w/ likely mild w/d, as well as medication non compliance (reports last Amlodipine 10/13). DDx includes but not limited to AKA, gastritis, GERD, pancreatitis, SBO, partial SBO, complication of lab band, other toxic / metabolic / electrolyte disturbance, mild alcohol w/d, other pathology. Initialy CIWA 6. Will give librium, PPI, antiemetics, IVF, get CT a/p. Dispo pending w/u and clinical status Pt p/w abd pain, n/v, one watery BM, hx lap band. Recent alcohol abuse w/ likely mild w/d, as well as medication non compliance (reports last Amlodipine 10/13). DDx includes but not limited to AKA, gastritis, GERD, pancreatitis, SBO, partial SBO, complication of lab band, other toxic / metabolic / electrolyte disturbance, mild alcohol w/d, other pathology. Initially CIWA 6. Will give librium, PPI, antiemetics, IVF, get CT a/p. Dispo pending w/u and clinical status

## 2023-10-16 NOTE — H&P ADULT - NSHPPHYSICALEXAM_GEN_ALL_CORE
CONSTITUTIONAL: Well appearing and in no apparent distress.  EYES: Clear bilaterally, pupils equal, round and reactive to light.  CARDIAC: Normal rate, regular rhythm.   RESPIRATORY: Breath sounds clear and equal bilaterally.  GASTROINTESTINAL: Abdomen soft, slightly distended but non-tender. Non-tympanic  MUSCULOSKELETAL: Spine appears normal, range of motion is not limited, no muscle or joint tenderness  NEUROLOGICAL: Alert and oriented x 4, no focal deficits, no motor or sensory deficits

## 2023-10-16 NOTE — ED ADULT NURSE NOTE - NSFALLUNIVINTERV_ED_ALL_ED
Bed/Stretcher in lowest position, wheels locked, appropriate side rails in place/Call bell, personal items and telephone in reach/Instruct patient to call for assistance before getting out of bed/chair/stretcher/Non-slip footwear applied when patient is off stretcher/[place] to call system/Physically safe environment - no spills, clutter or unnecessary equipment/Purposeful proactive rounding/Room/bathroom lighting operational, light cord in reach

## 2023-10-16 NOTE — H&P ADULT - NSCORESITESY/N_GEN_A_CORE_RD
No Detail Level: Detailed Size Of Lesion: ***8mm light brn mac Size Of Lesion: ***4mm Size Of Lesion: 3mm tan mac Size Of Lesion: 4mm brn pap Size Of Lesion: 3x2mm

## 2023-10-16 NOTE — H&P ADULT - ASSESSMENT
62 yo M w/ PMHx HTN on Amlodipine 10 mg QD, HLD on Atorvastatin, Depression / anxiety on Bupropion, PSHx lap band ([facility] - 2005), lap band revision ([facility] - 2016), abdominoplasty, p/f 2 day of upper abd pain with nausea & emesis. Intermittent flatus and loose stool x 1 today. Abdomen soft, slightly distended but non-tender. Non-tympanic.  Patient hypertensive up to 180's but afebrile. Mild leukocytosis with left shift but no lactic acidosis. CT A/P multiple dilated proximal small bowel loops filled with air and layering contrast material with gradual tapering in the left lower quadrant, c/f low-grade small bowel obstruction versus ileus, and possible duodenitis. Clinical picture favors gastroenteritis over small bowel obstruction. Will admit to Gen Surg for monitoring and serial exam, with plan for NGT if emesis persists     Plan:   - Admit to Surgery regional - Dr. Chen    - NPO/IVF   - Serial abdominal exams   - NGT if emesis   - Follow up GI PCR, stool O & P, viral panel/COVID, and stool O & P   - Encourage OOB/ambulation   - DVT ppx     Seen and d/w chief resident. Attending aware and agrees with plan

## 2023-10-16 NOTE — H&P ADULT - NSHPLABSRESULTS_GEN_ALL_CORE
16.1   11.65 )-----------( 357      ( 16 Oct 2023 14:39 )             45.3     10-16    139  |  100  |  13  ----------------------------<  114<H>  3.6   |  26  |  0.87    Ca    10.6<H>      16 Oct 2023 14:39  Mg     2.1     10-16    TPro  7.5  /  Alb  4.8  /  TBili  1.2  /  DBili  x   /  AST  30  /  ALT  22  /  AlkPhos  98  10-16      CT Abdomen-Pelvis with PO & IV contrast      BOWEL: Stomach is collapsed limiting evaluation. Oral contrast material only advanced to level of proximal ileal loops. Dilated jejunal and proximal ileal loops up to 4 cm filled with air and layering contrast material with gradual tapering in the left lower quadrant. Mildly thickened duodenal folds in third portion with mild submucosal edema. Unremarkable gastric lap band, tubing and port. Appendix is not visualized. No evidence of inflammation in the pericecal region.  PERITONEUM: No ascites. No pneumoperitoneum. No abscess.  VESSELS: Within normal limits.  RETROPERITONEUM/LYMPH NODES: No lymphadenopathy. Partially calcified lymph node anterior to the pancreatic head (3:50), nonspecific.  ABDOMINAL WALL: Small fat-containing left inguinal hernia. Midline ventral wall postsurgical changes.  BONES: Within normal limits.

## 2023-10-17 ENCOUNTER — TRANSCRIPTION ENCOUNTER (OUTPATIENT)
Age: 63
End: 2023-10-17

## 2023-10-17 LAB
ANION GAP SERPL CALC-SCNC: 7 MMOL/L — SIGNIFICANT CHANGE UP (ref 5–17)
ANION GAP SERPL CALC-SCNC: 7 MMOL/L — SIGNIFICANT CHANGE UP (ref 5–17)
APTT BLD: 28.2 SEC — SIGNIFICANT CHANGE UP (ref 24.5–35.6)
APTT BLD: 28.2 SEC — SIGNIFICANT CHANGE UP (ref 24.5–35.6)
BLD GP AB SCN SERPL QL: NEGATIVE — SIGNIFICANT CHANGE UP
BUN SERPL-MCNC: 11 MG/DL — SIGNIFICANT CHANGE UP (ref 7–23)
BUN SERPL-MCNC: 11 MG/DL — SIGNIFICANT CHANGE UP (ref 7–23)
CALCIUM SERPL-MCNC: 9.2 MG/DL — SIGNIFICANT CHANGE UP (ref 8.4–10.5)
CALCIUM SERPL-MCNC: 9.2 MG/DL — SIGNIFICANT CHANGE UP (ref 8.4–10.5)
CHLORIDE SERPL-SCNC: 102 MMOL/L — SIGNIFICANT CHANGE UP (ref 96–108)
CHLORIDE SERPL-SCNC: 102 MMOL/L — SIGNIFICANT CHANGE UP (ref 96–108)
CO2 SERPL-SCNC: 27 MMOL/L — SIGNIFICANT CHANGE UP (ref 22–31)
CO2 SERPL-SCNC: 27 MMOL/L — SIGNIFICANT CHANGE UP (ref 22–31)
CREAT SERPL-MCNC: 0.93 MG/DL — SIGNIFICANT CHANGE UP (ref 0.5–1.3)
CREAT SERPL-MCNC: 0.93 MG/DL — SIGNIFICANT CHANGE UP (ref 0.5–1.3)
EGFR: 93 ML/MIN/1.73M2 — SIGNIFICANT CHANGE UP
EGFR: 93 ML/MIN/1.73M2 — SIGNIFICANT CHANGE UP
GLUCOSE SERPL-MCNC: 106 MG/DL — HIGH (ref 70–99)
GLUCOSE SERPL-MCNC: 106 MG/DL — HIGH (ref 70–99)
HCT VFR BLD CALC: 39.5 % — SIGNIFICANT CHANGE UP (ref 39–50)
HCT VFR BLD CALC: 39.5 % — SIGNIFICANT CHANGE UP (ref 39–50)
HCV AB S/CO SERPL IA: 0.04 S/CO — SIGNIFICANT CHANGE UP
HCV AB S/CO SERPL IA: 0.04 S/CO — SIGNIFICANT CHANGE UP
HCV AB SERPL-IMP: SIGNIFICANT CHANGE UP
HCV AB SERPL-IMP: SIGNIFICANT CHANGE UP
HGB BLD-MCNC: 14.2 G/DL — SIGNIFICANT CHANGE UP (ref 13–17)
HGB BLD-MCNC: 14.2 G/DL — SIGNIFICANT CHANGE UP (ref 13–17)
INR BLD: 0.91 — SIGNIFICANT CHANGE UP (ref 0.85–1.18)
INR BLD: 0.91 — SIGNIFICANT CHANGE UP (ref 0.85–1.18)
MAGNESIUM SERPL-MCNC: 2 MG/DL — SIGNIFICANT CHANGE UP (ref 1.6–2.6)
MAGNESIUM SERPL-MCNC: 2 MG/DL — SIGNIFICANT CHANGE UP (ref 1.6–2.6)
MCHC RBC-ENTMCNC: 32.1 PG — SIGNIFICANT CHANGE UP (ref 27–34)
MCHC RBC-ENTMCNC: 32.1 PG — SIGNIFICANT CHANGE UP (ref 27–34)
MCHC RBC-ENTMCNC: 35.9 GM/DL — SIGNIFICANT CHANGE UP (ref 32–36)
MCHC RBC-ENTMCNC: 35.9 GM/DL — SIGNIFICANT CHANGE UP (ref 32–36)
MCV RBC AUTO: 89.4 FL — SIGNIFICANT CHANGE UP (ref 80–100)
MCV RBC AUTO: 89.4 FL — SIGNIFICANT CHANGE UP (ref 80–100)
NRBC # BLD: 0 /100 WBCS — SIGNIFICANT CHANGE UP (ref 0–0)
NRBC # BLD: 0 /100 WBCS — SIGNIFICANT CHANGE UP (ref 0–0)
PHOSPHATE SERPL-MCNC: 2.5 MG/DL — SIGNIFICANT CHANGE UP (ref 2.5–4.5)
PHOSPHATE SERPL-MCNC: 2.5 MG/DL — SIGNIFICANT CHANGE UP (ref 2.5–4.5)
PLATELET # BLD AUTO: 242 K/UL — SIGNIFICANT CHANGE UP (ref 150–400)
PLATELET # BLD AUTO: 242 K/UL — SIGNIFICANT CHANGE UP (ref 150–400)
POTASSIUM SERPL-MCNC: 3.5 MMOL/L — SIGNIFICANT CHANGE UP (ref 3.5–5.3)
POTASSIUM SERPL-MCNC: 3.5 MMOL/L — SIGNIFICANT CHANGE UP (ref 3.5–5.3)
POTASSIUM SERPL-SCNC: 3.5 MMOL/L — SIGNIFICANT CHANGE UP (ref 3.5–5.3)
POTASSIUM SERPL-SCNC: 3.5 MMOL/L — SIGNIFICANT CHANGE UP (ref 3.5–5.3)
PROTHROM AB SERPL-ACNC: 10.4 SEC — SIGNIFICANT CHANGE UP (ref 9.5–13)
PROTHROM AB SERPL-ACNC: 10.4 SEC — SIGNIFICANT CHANGE UP (ref 9.5–13)
RBC # BLD: 4.42 M/UL — SIGNIFICANT CHANGE UP (ref 4.2–5.8)
RBC # BLD: 4.42 M/UL — SIGNIFICANT CHANGE UP (ref 4.2–5.8)
RBC # FLD: 12.6 % — SIGNIFICANT CHANGE UP (ref 10.3–14.5)
RBC # FLD: 12.6 % — SIGNIFICANT CHANGE UP (ref 10.3–14.5)
RH IG SCN BLD-IMP: POSITIVE — SIGNIFICANT CHANGE UP
SODIUM SERPL-SCNC: 136 MMOL/L — SIGNIFICANT CHANGE UP (ref 135–145)
SODIUM SERPL-SCNC: 136 MMOL/L — SIGNIFICANT CHANGE UP (ref 135–145)
WBC # BLD: 6.8 K/UL — SIGNIFICANT CHANGE UP (ref 3.8–10.5)
WBC # BLD: 6.8 K/UL — SIGNIFICANT CHANGE UP (ref 3.8–10.5)
WBC # FLD AUTO: 6.8 K/UL — SIGNIFICANT CHANGE UP (ref 3.8–10.5)
WBC # FLD AUTO: 6.8 K/UL — SIGNIFICANT CHANGE UP (ref 3.8–10.5)

## 2023-10-17 PROCEDURE — 74018 RADEX ABDOMEN 1 VIEW: CPT | Mod: 26

## 2023-10-17 RX ORDER — POLYETHYLENE GLYCOL 3350 17 G/17G
17 POWDER, FOR SOLUTION ORAL DAILY
Refills: 0 | Status: DISCONTINUED | OUTPATIENT
Start: 2023-10-17 | End: 2023-10-18

## 2023-10-17 RX ORDER — SENNA PLUS 8.6 MG/1
2 TABLET ORAL AT BEDTIME
Refills: 0 | Status: DISCONTINUED | OUTPATIENT
Start: 2023-10-17 | End: 2023-10-18

## 2023-10-17 RX ORDER — INFLUENZA VIRUS VACCINE 15; 15; 15; 15 UG/.5ML; UG/.5ML; UG/.5ML; UG/.5ML
0.5 SUSPENSION INTRAMUSCULAR ONCE
Refills: 0 | Status: DISCONTINUED | OUTPATIENT
Start: 2023-10-17 | End: 2023-10-18

## 2023-10-17 RX ORDER — PANTOPRAZOLE SODIUM 20 MG/1
40 TABLET, DELAYED RELEASE ORAL DAILY
Refills: 0 | Status: DISCONTINUED | OUTPATIENT
Start: 2023-10-17 | End: 2023-10-18

## 2023-10-17 RX ORDER — POTASSIUM CHLORIDE 20 MEQ
10 PACKET (EA) ORAL
Refills: 0 | Status: COMPLETED | OUTPATIENT
Start: 2023-10-17 | End: 2023-10-17

## 2023-10-17 RX ADMIN — HEPARIN SODIUM 5000 UNIT(S): 5000 INJECTION INTRAVENOUS; SUBCUTANEOUS at 14:34

## 2023-10-17 RX ADMIN — ATORVASTATIN CALCIUM 10 MILLIGRAM(S): 80 TABLET, FILM COATED ORAL at 22:09

## 2023-10-17 RX ADMIN — PANTOPRAZOLE SODIUM 40 MILLIGRAM(S): 20 TABLET, DELAYED RELEASE ORAL at 18:06

## 2023-10-17 RX ADMIN — HEPARIN SODIUM 5000 UNIT(S): 5000 INJECTION INTRAVENOUS; SUBCUTANEOUS at 07:31

## 2023-10-17 RX ADMIN — SODIUM CHLORIDE 120 MILLILITER(S): 9 INJECTION, SOLUTION INTRAVENOUS at 01:28

## 2023-10-17 RX ADMIN — BUPROPION HYDROCHLORIDE 300 MILLIGRAM(S): 150 TABLET, EXTENDED RELEASE ORAL at 14:34

## 2023-10-17 RX ADMIN — HEPARIN SODIUM 5000 UNIT(S): 5000 INJECTION INTRAVENOUS; SUBCUTANEOUS at 22:09

## 2023-10-17 RX ADMIN — SODIUM CHLORIDE 120 MILLILITER(S): 9 INJECTION, SOLUTION INTRAVENOUS at 18:00

## 2023-10-17 RX ADMIN — SODIUM CHLORIDE 120 MILLILITER(S): 9 INJECTION, SOLUTION INTRAVENOUS at 09:39

## 2023-10-17 RX ADMIN — AMLODIPINE BESYLATE 10 MILLIGRAM(S): 2.5 TABLET ORAL at 07:32

## 2023-10-17 RX ADMIN — Medication 100 MILLIEQUIVALENT(S): at 14:02

## 2023-10-17 RX ADMIN — SENNA PLUS 2 TABLET(S): 8.6 TABLET ORAL at 22:09

## 2023-10-17 RX ADMIN — Medication 100 MILLIEQUIVALENT(S): at 11:56

## 2023-10-17 RX ADMIN — POLYETHYLENE GLYCOL 3350 17 GRAM(S): 17 POWDER, FOR SOLUTION ORAL at 17:55

## 2023-10-17 RX ADMIN — Medication 100 MILLIEQUIVALENT(S): at 17:55

## 2023-10-17 NOTE — DISCHARGE NOTE NURSING/CASE MANAGEMENT/SOCIAL WORK - PATIENT PORTAL LINK FT
You can access the FollowMyHealth Patient Portal offered by [facility] by registering at the following website: [email or web address]. By joining [facility]’s FollowMyHealth portal, you will also be able to view your health information using other applications (apps) compatible with our system.

## 2023-10-17 NOTE — PATIENT PROFILE ADULT - FALL HARM RISK - UNIVERSAL INTERVENTIONS
Bed in lowest position, wheels locked, appropriate side rails in place/Call bell, personal items and telephone in reach/Instruct patient to call for assistance before getting out of bed or chair/Non-slip footwear when patient is out of bed/[place] to call system/Physically safe environment - no spills, clutter or unnecessary equipment/Purposeful Proactive Rounding/Room/bathroom lighting operational, light cord in reach

## 2023-10-17 NOTE — CHART NOTE - NSCHARTNOTEFT_GEN_A_CORE
Pt seen and examined in bed on 9UR. Pt endorses feeling tired and looking forward to getting sleep. Denies nausea/vomiting at this time. On exam, abdomen is soft, NT, ND, nonperitonitic.

## 2023-10-18 ENCOUNTER — TRANSCRIPTION ENCOUNTER (OUTPATIENT)
Age: 63
End: 2023-10-18

## 2023-10-18 VITALS
TEMPERATURE: 99 F | DIASTOLIC BLOOD PRESSURE: 89 MMHG | SYSTOLIC BLOOD PRESSURE: 162 MMHG | HEART RATE: 85 BPM | RESPIRATION RATE: 16 BRPM | OXYGEN SATURATION: 99 %

## 2023-10-18 LAB
ANION GAP SERPL CALC-SCNC: 10 MMOL/L — SIGNIFICANT CHANGE UP (ref 5–17)
ANION GAP SERPL CALC-SCNC: 10 MMOL/L — SIGNIFICANT CHANGE UP (ref 5–17)
BUN SERPL-MCNC: 5 MG/DL — LOW (ref 7–23)
BUN SERPL-MCNC: 5 MG/DL — LOW (ref 7–23)
CALCIUM SERPL-MCNC: 9.8 MG/DL — SIGNIFICANT CHANGE UP (ref 8.4–10.5)
CALCIUM SERPL-MCNC: 9.8 MG/DL — SIGNIFICANT CHANGE UP (ref 8.4–10.5)
CHLORIDE SERPL-SCNC: 107 MMOL/L — SIGNIFICANT CHANGE UP (ref 96–108)
CHLORIDE SERPL-SCNC: 107 MMOL/L — SIGNIFICANT CHANGE UP (ref 96–108)
CO2 SERPL-SCNC: 24 MMOL/L — SIGNIFICANT CHANGE UP (ref 22–31)
CO2 SERPL-SCNC: 24 MMOL/L — SIGNIFICANT CHANGE UP (ref 22–31)
CREAT SERPL-MCNC: 0.85 MG/DL — SIGNIFICANT CHANGE UP (ref 0.5–1.3)
CREAT SERPL-MCNC: 0.85 MG/DL — SIGNIFICANT CHANGE UP (ref 0.5–1.3)
EGFR: 99 ML/MIN/1.73M2 — SIGNIFICANT CHANGE UP
EGFR: 99 ML/MIN/1.73M2 — SIGNIFICANT CHANGE UP
GLUCOSE SERPL-MCNC: 107 MG/DL — HIGH (ref 70–99)
GLUCOSE SERPL-MCNC: 107 MG/DL — HIGH (ref 70–99)
HCT VFR BLD CALC: 44.1 % — SIGNIFICANT CHANGE UP (ref 39–50)
HCT VFR BLD CALC: 44.1 % — SIGNIFICANT CHANGE UP (ref 39–50)
HGB BLD-MCNC: 15.8 G/DL — SIGNIFICANT CHANGE UP (ref 13–17)
HGB BLD-MCNC: 15.8 G/DL — SIGNIFICANT CHANGE UP (ref 13–17)
MAGNESIUM SERPL-MCNC: 2 MG/DL — SIGNIFICANT CHANGE UP (ref 1.6–2.6)
MAGNESIUM SERPL-MCNC: 2 MG/DL — SIGNIFICANT CHANGE UP (ref 1.6–2.6)
MCHC RBC-ENTMCNC: 32.2 PG — SIGNIFICANT CHANGE UP (ref 27–34)
MCHC RBC-ENTMCNC: 32.2 PG — SIGNIFICANT CHANGE UP (ref 27–34)
MCHC RBC-ENTMCNC: 35.8 GM/DL — SIGNIFICANT CHANGE UP (ref 32–36)
MCHC RBC-ENTMCNC: 35.8 GM/DL — SIGNIFICANT CHANGE UP (ref 32–36)
MCV RBC AUTO: 89.8 FL — SIGNIFICANT CHANGE UP (ref 80–100)
MCV RBC AUTO: 89.8 FL — SIGNIFICANT CHANGE UP (ref 80–100)
NRBC # BLD: 0 /100 WBCS — SIGNIFICANT CHANGE UP (ref 0–0)
NRBC # BLD: 0 /100 WBCS — SIGNIFICANT CHANGE UP (ref 0–0)
PHOSPHATE SERPL-MCNC: 2.5 MG/DL — SIGNIFICANT CHANGE UP (ref 2.5–4.5)
PHOSPHATE SERPL-MCNC: 2.5 MG/DL — SIGNIFICANT CHANGE UP (ref 2.5–4.5)
PLATELET # BLD AUTO: 358 K/UL — SIGNIFICANT CHANGE UP (ref 150–400)
PLATELET # BLD AUTO: 358 K/UL — SIGNIFICANT CHANGE UP (ref 150–400)
POTASSIUM SERPL-MCNC: 3.6 MMOL/L — SIGNIFICANT CHANGE UP (ref 3.5–5.3)
POTASSIUM SERPL-MCNC: 3.6 MMOL/L — SIGNIFICANT CHANGE UP (ref 3.5–5.3)
POTASSIUM SERPL-SCNC: 3.6 MMOL/L — SIGNIFICANT CHANGE UP (ref 3.5–5.3)
POTASSIUM SERPL-SCNC: 3.6 MMOL/L — SIGNIFICANT CHANGE UP (ref 3.5–5.3)
RBC # BLD: 4.91 M/UL — SIGNIFICANT CHANGE UP (ref 4.2–5.8)
RBC # BLD: 4.91 M/UL — SIGNIFICANT CHANGE UP (ref 4.2–5.8)
RBC # FLD: 12.5 % — SIGNIFICANT CHANGE UP (ref 10.3–14.5)
RBC # FLD: 12.5 % — SIGNIFICANT CHANGE UP (ref 10.3–14.5)
SODIUM SERPL-SCNC: 141 MMOL/L — SIGNIFICANT CHANGE UP (ref 135–145)
SODIUM SERPL-SCNC: 141 MMOL/L — SIGNIFICANT CHANGE UP (ref 135–145)
WBC # BLD: 8.27 K/UL — SIGNIFICANT CHANGE UP (ref 3.8–10.5)
WBC # BLD: 8.27 K/UL — SIGNIFICANT CHANGE UP (ref 3.8–10.5)
WBC # FLD AUTO: 8.27 K/UL — SIGNIFICANT CHANGE UP (ref 3.8–10.5)
WBC # FLD AUTO: 8.27 K/UL — SIGNIFICANT CHANGE UP (ref 3.8–10.5)

## 2023-10-18 PROCEDURE — 85025 COMPLETE CBC W/AUTO DIFF WBC: CPT

## 2023-10-18 PROCEDURE — 96361 HYDRATE IV INFUSION ADD-ON: CPT

## 2023-10-18 PROCEDURE — 83690 ASSAY OF LIPASE: CPT

## 2023-10-18 PROCEDURE — 83735 ASSAY OF MAGNESIUM: CPT

## 2023-10-18 PROCEDURE — 86900 BLOOD TYPING SEROLOGIC ABO: CPT

## 2023-10-18 PROCEDURE — 83605 ASSAY OF LACTIC ACID: CPT

## 2023-10-18 PROCEDURE — 82330 ASSAY OF CALCIUM: CPT

## 2023-10-18 PROCEDURE — 84132 ASSAY OF SERUM POTASSIUM: CPT

## 2023-10-18 PROCEDURE — 81001 URINALYSIS AUTO W/SCOPE: CPT

## 2023-10-18 PROCEDURE — 36415 COLL VENOUS BLD VENIPUNCTURE: CPT

## 2023-10-18 PROCEDURE — 84100 ASSAY OF PHOSPHORUS: CPT

## 2023-10-18 PROCEDURE — 85027 COMPLETE CBC AUTOMATED: CPT

## 2023-10-18 PROCEDURE — 80307 DRUG TEST PRSMV CHEM ANLYZR: CPT

## 2023-10-18 PROCEDURE — 86901 BLOOD TYPING SEROLOGIC RH(D): CPT

## 2023-10-18 PROCEDURE — 80053 COMPREHEN METABOLIC PANEL: CPT

## 2023-10-18 PROCEDURE — 99285 EMERGENCY DEPT VISIT HI MDM: CPT

## 2023-10-18 PROCEDURE — 80048 BASIC METABOLIC PNL TOTAL CA: CPT

## 2023-10-18 PROCEDURE — 84295 ASSAY OF SERUM SODIUM: CPT

## 2023-10-18 PROCEDURE — 96375 TX/PRO/DX INJ NEW DRUG ADDON: CPT

## 2023-10-18 PROCEDURE — 86803 HEPATITIS C AB TEST: CPT

## 2023-10-18 PROCEDURE — 82803 BLOOD GASES ANY COMBINATION: CPT

## 2023-10-18 PROCEDURE — 85730 THROMBOPLASTIN TIME PARTIAL: CPT

## 2023-10-18 PROCEDURE — 96374 THER/PROPH/DIAG INJ IV PUSH: CPT

## 2023-10-18 PROCEDURE — 74018 RADEX ABDOMEN 1 VIEW: CPT

## 2023-10-18 PROCEDURE — 93005 ELECTROCARDIOGRAM TRACING: CPT

## 2023-10-18 PROCEDURE — 85610 PROTHROMBIN TIME: CPT

## 2023-10-18 PROCEDURE — 87637 SARSCOV2&INF A&B&RSV AMP PRB: CPT

## 2023-10-18 PROCEDURE — 74177 CT ABD & PELVIS W/CONTRAST: CPT | Mod: MA

## 2023-10-18 PROCEDURE — 86850 RBC ANTIBODY SCREEN: CPT

## 2023-10-18 RX ORDER — POLYETHYLENE GLYCOL 3350 17 G/17G
17 POWDER, FOR SOLUTION ORAL DAILY
Refills: 0 | Status: DISCONTINUED | OUTPATIENT
Start: 2023-10-18 | End: 2023-10-18

## 2023-10-18 RX ORDER — POLYETHYLENE GLYCOL 3350 17 G/17G
17 POWDER, FOR SOLUTION ORAL
Qty: 68 | Refills: 0
Start: 2023-10-18 | End: 2023-10-21

## 2023-10-18 RX ADMIN — PANTOPRAZOLE SODIUM 40 MILLIGRAM(S): 20 TABLET, DELAYED RELEASE ORAL at 12:08

## 2023-10-18 RX ADMIN — SODIUM CHLORIDE 120 MILLILITER(S): 9 INJECTION, SOLUTION INTRAVENOUS at 12:08

## 2023-10-18 RX ADMIN — HEPARIN SODIUM 5000 UNIT(S): 5000 INJECTION INTRAVENOUS; SUBCUTANEOUS at 12:08

## 2023-10-18 RX ADMIN — POLYETHYLENE GLYCOL 3350 17 GRAM(S): 17 POWDER, FOR SOLUTION ORAL at 12:08

## 2023-10-18 RX ADMIN — BUPROPION HYDROCHLORIDE 300 MILLIGRAM(S): 150 TABLET, EXTENDED RELEASE ORAL at 12:08

## 2023-10-18 RX ADMIN — AMLODIPINE BESYLATE 10 MILLIGRAM(S): 2.5 TABLET ORAL at 05:09

## 2023-10-18 RX ADMIN — HEPARIN SODIUM 5000 UNIT(S): 5000 INJECTION INTRAVENOUS; SUBCUTANEOUS at 05:09

## 2023-10-18 NOTE — DISCHARGE NOTE PROVIDER - NSDCFUADDINST_GEN_ALL_CORE_FT
-Continue a soft diet   - Please take bowel regimen Miralax once a day.   -Notify physician for fever greater than 101, worsening abdominal pain, nausea and or vomiting.   -Follow-up with Dr. Chen 1 week. Call the office to make an appointment.

## 2023-10-18 NOTE — DISCHARGE NOTE PROVIDER - NSDCCPCAREPLAN_GEN_ALL_CORE_FT
PRINCIPAL DISCHARGE DIAGNOSIS  Diagnosis: Abdominal pain  Assessment and Plan of Treatment: 62 yo M w/ PMHx HTN on Amlodipine 10 mg QD, HLD on Atorvastatin, Depression / anxiety on Bupropion, PSHx lap band ([facility] - 2005), [facility] band revision ([facility] - 2016), abdominoplasty, p/f 2 day of upper abd pain with nausea & emesis. Intermittent flatus and loose stool x 1 today. Abdomen soft, slightly distended but non-tender. Non-tympanic.  Patient hypertensive up to 180's but afebrile. Mild leukocytosis with left shift but no lactic acidosis. CT A/P multiple dilated proximal small bowel loops filled with air and layering contrast material with gradual tapering in the left lower quadrant, c/f low-grade small bowel obstruction versus ileus, and possible duodenitis. 10/18 abdominal x-ray revealed  There has been  transit of oral contrast material to the  ascending colon. Patient passing flatus, nausea and emesis resolved. Patient started on clear liquid diet and tolerating. At time of discharge, pt was tolerating a soft diet, passing flatus , nausea and vomiting resolved. Plan is to follow up with Dr. Chen in the office.  -Continue a soft diet   - Please take bowel regimen Miralax once a day.   -Notify physician for fever greater than 101, worsening abdominal pain, nausea and or vomiting.   -Follow-up with Dr. Chen 1 week. Call the office to make an appointment.        SECONDARY DISCHARGE DIAGNOSES  Diagnosis: Enteritis  Assessment and Plan of Treatment:     Diagnosis: Ileus  Assessment and Plan of Treatment:

## 2023-10-18 NOTE — DISCHARGE NOTE PROVIDER - NSDCMRMEDTOKEN_GEN_ALL_CORE_FT
amLODIPine 10 mg oral tablet: 1 tab(s) orally once a day  atorvastatin 10 mg oral tablet: 1 tab(s) orally once a day  polyethylene glycol 3350 oral powder for reconstitution: 17 gram(s) orally once a day as needed for  constipation MDD: 17 g  Wellbutrin  mg/24 hours oral tablet, extended release: 1 tab(s) orally every 24 hours

## 2023-10-18 NOTE — DISCHARGE NOTE PROVIDER - CARE PROVIDER_API CALL
Chin Chen  Surgery  [address] [place] [id number]  Phone: [phone]  Fax: [phone]  Follow Up Time: 1 week

## 2023-10-18 NOTE — DISCHARGE NOTE PROVIDER - NSDCFUSCHEDAPPT_GEN_ALL_CORE_FT
[facility]  ORTHOSURG [facility]  Scheduled Appointment: 11/03/2023

## 2023-10-18 NOTE — PROGRESS NOTE ADULT - SUBJECTIVE AND OBJECTIVE BOX
ON: MARBELLA soft/NT/ND/nonperitonitic, -n/-v,        SUBJECTIVE: Patient seen and examined bedside by Surgical resident. Pt. was easily arousable this morning and able to sit upright in bed with no issues. Pt denied any pain, SOB chest pain this morning. states that he has passed some gas but no BMs today.     amLODIPine   Tablet 10 milliGRAM(s) Oral daily  heparin   Injectable 5000 Unit(s) SubCutaneous every 8 hours    MEDICATIONS  (PRN):  ondansetron Injectable 4 milliGRAM(s) IV Push once PRN Nausea and/or Vomiting      I&O's Detail    16 Oct 2023 07:01  -  17 Oct 2023 07:00  --------------------------------------------------------  IN:    Lactated Ringers: 840 mL  Total IN: 840 mL    OUT:  Total OUT: 0 mL    Total NET: 840 mL          Vital Signs Last 24 Hrs  T(C): 36.9 (17 Oct 2023 05:26), Max: 37.1 (16 Oct 2023 23:59)  T(F): 98.4 (17 Oct 2023 05:26), Max: 98.7 (16 Oct 2023 23:59)  HR: 68 (17 Oct 2023 05:26) (57 - 97)  BP: 123/71 (17 Oct 2023 05:26) (123/71 - 183/83)  BP(mean): --  RR: 18 (17 Oct 2023 05:26) (17 - 18)  SpO2: 99% (17 Oct 2023 05:26) (96% - 100%)    Parameters below as of 17 Oct 2023 05:26  Patient On (Oxygen Delivery Method): room air        General: NAD, resting comfortably in bed  C/V: NSR  Pulm: Nonlabored breathing, no respiratory distress  Abd: soft, NT/ND  Extrem: WWP, no edema, SCDs in place    LABS:                        14.2   6.80  )-----------( 242      ( 17 Oct 2023 05:30 )             39.5     10-17    136  |  102  |  11  ----------------------------<  106<H>  3.5   |  27  |  0.93    Ca    9.2      17 Oct 2023 05:30  Phos  2.5     10-17  Mg     2.0     10-17    TPro  7.5  /  Alb  4.8  /  TBili  1.2  /  DBili  x   /  AST  30  /  ALT  22  /  AlkPhos  98  10-16    PT/INR - ( 17 Oct 2023 05:30 )   PT: 10.4 sec;   INR: 0.91          PTT - ( 17 Oct 2023 05:30 )  PTT:28.2 sec  Urinalysis Basic - ( 17 Oct 2023 05:30 )    Color: x / Appearance: x / SG: x / pH: x  Gluc: 106 mg/dL / Ketone: x  / Bili: x / Urobili: x   Blood: x / Protein: x / Nitrite: x   Leuk Esterase: x / RBC: x / WBC x   Sq Epi: x / Non Sq Epi: x / Bacteria: x        RADIOLOGY & ADDITIONAL STUDIES:  CT Abdomen and Pelvis w/ Oral Cont and w/ IV Cont:   ACC: [id number] EXAM:  CT ABDOMEN AND PELVIS OC IC   ORDERED BY: VASQUEZ NEGRETE     PROCEDURE DATE:  10/16/2023          INTERPRETATION:  CLINICAL INFORMATION: Abdominal pain, nausea, vomiting,   diarrhea and bloating    COMPARISON: None.    CONTRAST/COMPLICATIONS:  IV Contrast: Isovue 370  90 cc administered   10 cc discarded  Oral Contrast: Omnipaque 350  Complications: None reported at time of study completion    PROCEDURE:  CT of the Abdomen and Pelvis was performed.  Sagittal and coronal reformats were performed.    FINDINGS:  LOWER CHEST: Within normal limits.    LIVER: Couple subcentimeter hypodensities too small to characterize.  BILE DUCTS: Normal caliber.  GALLBLADDER: Within normal limits.  SPLEEN: Within normal limits.  PANCREAS: Within normal limits.  ADRENALS: Within normal limits.  KIDNEYS/URETERS: No stones or hydronephrosis. Left renal cyst.    BLADDER: Within normal limits.  REPRODUCTIVE ORGANS: Prostate within normal limits.    BOWEL: Stomach is collapsed limiting evaluation. Oral contrast material   only advanced to level of proximal ileal loops. Dilated jejunal and   proximal ileal loops up to 4 cm filled with air and layering contrast   material with gradual tapering in the left lower quadrant. Mildly   thickened duodenal folds in third portion with mild submucosal edema.   Unremarkable gastric lap band, tubing and port.  Appendix is not   visualized. No evidence of inflammation in the pericecal region.  PERITONEUM: No ascites. No pneumoperitoneum. No abscess.  VESSELS: Within normal limits.  RETROPERITONEUM/LYMPH NODES: No lymphadenopathy. Partially calcified   lymph node anterior to the pancreatic head (3:50), nonspecific.  ABDOMINAL WALL: Small fat-containing left inguinal hernia. Midline   ventral wall postsurgical changes.  BONES: Within normal limits.    IMPRESSION:  Multiple dilated proximal small bowel loops filled with air and layering   contrast material with gradual tapering in the left lower quadrant,   differential is a low-grade small bowel obstruction versus ileus,   question duodenitis. Follow-up delayed CT in 4-6 hours can be obtained   for assessment of transit of administered oral contrast material through   small bowel into the colon, if clinically warranted.        --- End of Report ---            MISAEL SILVA MD; Attending Radiologist  This document has been electronically signed. Oct 16 2023  5:06PM (10-16-23 @ 16:39)      Urinalysis with Rflx Culture (collected 10-16-23 @ 16:16)    
SUBJECTIVE: Patient examined bedside with chief resident. Patient reports multiple episodes of emesis overnight. Patient  denies abdominal pain. Patient reports he is ambulating and using incentive spirometer.  Patient denies SOB and chest pain. Patient making adequate urine output.         amLODIPine   Tablet 10 milliGRAM(s) Oral daily  heparin   Injectable 5000 Unit(s) SubCutaneous every 8 hours      Vital Signs Last 24 Hrs  T(C): 36.9 (17 Oct 2023 05:26), Max: 37.1 (16 Oct 2023 23:59)  T(F): 98.4 (17 Oct 2023 05:26), Max: 98.7 (16 Oct 2023 23:59)  HR: 68 (17 Oct 2023 05:26) (57 - 97)  BP: 123/71 (17 Oct 2023 05:26) (123/71 - 183/83)  BP(mean): --  RR: 18 (17 Oct 2023 05:26) (17 - 18)  SpO2: 99% (17 Oct 2023 05:26) (96% - 100%)    Parameters below as of 17 Oct 2023 05:26  Patient On (Oxygen Delivery Method): room air      I&O's Detail    16 Oct 2023 07:01  -  17 Oct 2023 07:00  --------------------------------------------------------  IN:    Lactated Ringers: 840 mL  Total IN: 840 mL    OUT:  Total OUT: 0 mL    Total NET: 840 mL          General: NAD, resting comfortably in bed  C/V: NSR  Pulm: Nonlabored breathing, no respiratory distress  Abd: soft, mildly distended , non tender  Extrem: WWP, no edema, SCDs in place        LABS:                        14.2   6.80  )-----------( 242      ( 17 Oct 2023 05:30 )             39.5     10-17    136  |  102  |  11  ----------------------------<  106<H>  3.5   |  27  |  0.93    Ca    9.2      17 Oct 2023 05:30  Phos  2.5     10-17  Mg     2.0     10-17    TPro  7.5  /  Alb  4.8  /  TBili  1.2  /  DBili  x   /  AST  30  /  ALT  22  /  AlkPhos  98  10-16    PT/INR - ( 17 Oct 2023 05:30 )   PT: 10.4 sec;   INR: 0.91          PTT - ( 17 Oct 2023 05:30 )  PTT:28.2 sec  Urinalysis Basic - ( 17 Oct 2023 05:30 )    Color: x / Appearance: x / SG: x / pH: x  Gluc: 106 mg/dL / Ketone: x  / Bili: x / Urobili: x   Blood: x / Protein: x / Nitrite: x   Leuk Esterase: x / RBC: x / WBC x   Sq Epi: x / Non Sq Epi: x / Bacteria: x      
INTERVAL HPI/OVERNIGHT EVENTS: -n/-v, +f/-bm, tolerating PO   10/17: AXR has been  transit of oral contrast material to the  ascending colon.. Bowel regimine ordered.  clears started  tolerated soft  per Dr. Chen ,+ flatus     SUBJECTIVE: Patient examined bedside with chief resident. Patient observed resting comfortably and not in distress.   Patient reports nausea and emesis has resolved. Patient reports that he is tolerating soft diet.  Patient denies SOB, chest pain, nausea and emesis. Patient making adequate urine output.        amLODIPine   Tablet 10 milliGRAM(s) Oral daily  heparin   Injectable 5000 Unit(s) SubCutaneous every 8 hours      Vital Signs Last 24 Hrs  T(C): 36.7 (18 Oct 2023 08:40), Max: 37.1 (17 Oct 2023 12:33)  T(F): 98 (18 Oct 2023 08:40), Max: 98.8 (17 Oct 2023 12:33)  HR: 85 (18 Oct 2023 08:40) (65 - 96)  BP: 154/80 (18 Oct 2023 08:40) (121/72 - 162/84)  BP(mean): --  RR: 17 (18 Oct 2023 08:40) (16 - 17)  SpO2: 97% (18 Oct 2023 08:40) (97% - 100%)    Parameters below as of 18 Oct 2023 08:40  Patient On (Oxygen Delivery Method): room air      I&O's Detail    17 Oct 2023 07:01  -  18 Oct 2023 07:00  --------------------------------------------------------  IN:    IV PiggyBack: 300 mL    Lactated Ringers: 3120 mL    Oral Fluid: 420 mL  Total IN: 3840 mL    OUT:    Voided (mL): 3620 mL  Total OUT: 3620 mL    Total NET: 220 mL      18 Oct 2023 07:01  -  18 Oct 2023 09:59  --------------------------------------------------------  IN:    Oral Fluid: 500 mL  Total IN: 500 mL    OUT:  Total OUT: 0 mL    Total NET: 500 mL          General: NAD, resting comfortably in bed  C/V: NSR  Pulm: Nonlabored breathing, no respiratory distress  Abd: soft, NT/ND.  Extrem: WWP, no edema, SCDs in place      LABS:                        15.8   8.27  )-----------( 358      ( 18 Oct 2023 08:52 )             44.1     10-18    141  |  107  |  5<L>  ----------------------------<  107<H>  3.6   |  24  |  0.85    Ca    9.8      18 Oct 2023 08:52  Phos  2.5     10-18  Mg     2.0     10-18    TPro  7.5  /  Alb  4.8  /  TBili  1.2  /  DBili  x   /  AST  30  /  ALT  22  /  AlkPhos  98  10-16    PT/INR - ( 17 Oct 2023 05:30 )   PT: 10.4 sec;   INR: 0.91          PTT - ( 17 Oct 2023 05:30 )  PTT:28.2 sec  Urinalysis Basic - ( 18 Oct 2023 08:52 )    Color: x / Appearance: x / SG: x / pH: x  Gluc: 107 mg/dL / Ketone: x  / Bili: x / Urobili: x   Blood: x / Protein: x / Nitrite: x   Leuk Esterase: x / RBC: x / WBC x   Sq Epi: x / Non Sq Epi: x / Bacteria: x

## 2023-10-18 NOTE — PROGRESS NOTE ADULT - ASSESSMENT
64 yo M w/ PMHx HTN on Amlodipine 10 mg QD, HLD on Atorvastatin, Depression / anxiety on Bupropion, PSHx lap band ([facility] - 2005), lap band revision ([facility] - 2016), abdominoplasty, p/f 2 day of upper abd pain with nausea & emesis. Intermittent flatus and loose stool x 1 today. Abdomen soft, slightly distended but non-tender. Non-tympanic.  Patient hypertensive up to 180's but afebrile. Mild leukocytosis with left shift but no lactic acidosis. CT A/P multiple dilated proximal small bowel loops filled with air and layering contrast material with gradual tapering in the left lower quadrant, c/f low-grade small bowel obstruction versus ileus, and possible duodenitis. Clinical picture favors gastroenteritis over small bowel obstruction. Will admit to Gen Surg for monitoring and serial exam, with plan for NGT if emesis persists     -SOFT diet   -Pain/nausea control   - IVF   -HSQ DVT ppx   -Protonix   -SCDs, OOB/A, IS   -AM labs  - discharge home today no needs      
62 yo M w/ PMHx HTN on Amlodipine 10 mg QD, HLD on Atorvastatin, Depression / anxiety on Bupropion, PSHx lap band ([facility] - 2005), lap band revision ([facility] - 2016), abdominoplasty, p/f 2 day of upper abd pain with nausea & emesis. Intermittent flatus and loose stool x 1 today. Abdomen soft, slightly distended but non-tender. Non-tympanic.  Patient hypertensive up to 180's but afebrile. Mild leukocytosis with left shift but no lactic acidosis. CT A/P multiple dilated proximal small bowel loops filled with air and layering contrast material with gradual tapering in the left lower quadrant, c/f low-grade small bowel obstruction versus ileus, and possible duodenitis. Clinical picture favors gastroenteritis over small bowel obstruction. Will admit to Gen Surg for monitoring and serial exam, with plan for NGT if emesis persists     NPO  pain nausea control prn  AXR  MARBELLA  NGT if emesis   
64 yo M w/ PMHx HTN on Amlodipine 10 mg QD, HLD on Atorvastatin, Depression / anxiety on Bupropion, PSHx lap band ([facility] - 2005), lap band revision ([facility] - 2016), abdominoplasty, p/f 2 day of upper abd pain with nausea & emesis. Intermittent flatus and loose stool x 1 today. Abdomen soft, slightly distended but non-tender. Non-tympanic.Patient hypertensive up to 180's but afebrile. Mild leukocytosis with left shift but no lactic acidosis. CT A/P multiple dilated proximal small bowel loops filled with air and layering contrast material with gradual tapering in the left lower quadrant, c/f low-grade small bowel obstruction versus ileus, and possible duodenitis.    NPO  IVF  Pain/nausea control  AM labs  SCDs, OOB/A, IS   HSQ DVT ppx  serial abdominal exam

## 2023-10-18 NOTE — DISCHARGE NOTE PROVIDER - HOSPITAL COURSE
64 yo M w/ PMHx HTN on Amlodipine 10 mg QD, HLD on Atorvastatin, Depression / anxiety on Bupropion, PSHx lap band ([facility] - 2005), lap band revision ([facility] - 2016), abdominoplasty, p/f 2 day of upper abd pain with nausea & emesis. Intermittent flatus and loose stool x 1 today. Abdomen soft, slightly distended but non-tender. Non-tympanic.  Patient hypertensive up to 180's but afebrile. Mild leukocytosis with left shift but no lactic acidosis. CT A/P multiple dilated proximal small bowel loops filled with air and layering contrast material with gradual tapering in the left lower quadrant, c/f low-grade small bowel obstruction versus ileus, and possible duodenitis. 10/18 abdominal x-ray revealed  There has been  transit of oral contrast material to the  ascending colon. Patient passing flatus, nausea and emesis resolved. Patient started on clear liquid diet and tolerating. At time of discharge, pt was tolerating a soft diet, passing flatus , nausea and vomiting resolved. Plan is to follow up with Dr. Chen in the office.

## 2023-10-23 PROBLEM — I10 ESSENTIAL (PRIMARY) HYPERTENSION: Chronic | Status: ACTIVE | Noted: 2023-10-16

## 2023-10-23 PROBLEM — F41.9 ANXIETY DISORDER, UNSPECIFIED: Chronic | Status: ACTIVE | Noted: 2023-10-16

## 2023-10-23 PROBLEM — E78.5 HYPERLIPIDEMIA, UNSPECIFIED: Chronic | Status: ACTIVE | Noted: 2023-10-16

## 2023-10-26 DIAGNOSIS — Z98.84 BARIATRIC SURGERY STATUS: ICD-10-CM

## 2023-10-26 DIAGNOSIS — K56.7 ILEUS, UNSPECIFIED: ICD-10-CM

## 2023-10-26 DIAGNOSIS — R10.9 UNSPECIFIED ABDOMINAL PAIN: ICD-10-CM

## 2023-10-26 DIAGNOSIS — F41.9 ANXIETY DISORDER, UNSPECIFIED: ICD-10-CM

## 2023-10-26 DIAGNOSIS — F32.A DEPRESSION, UNSPECIFIED: ICD-10-CM

## 2023-10-26 DIAGNOSIS — I10 ESSENTIAL (PRIMARY) HYPERTENSION: ICD-10-CM

## 2023-10-26 DIAGNOSIS — K52.9 NONINFECTIVE GASTROENTERITIS AND COLITIS, UNSPECIFIED: ICD-10-CM

## 2023-10-26 DIAGNOSIS — E78.5 HYPERLIPIDEMIA, UNSPECIFIED: ICD-10-CM

## 2023-11-02 ENCOUNTER — APPOINTMENT (OUTPATIENT)
Dept: SURGERY | Facility: CLINIC | Age: 63
End: 2023-11-02
Payer: COMMERCIAL

## 2023-11-02 VITALS
HEART RATE: 58 BPM | BODY MASS INDEX: 25.48 KG/M2 | HEIGHT: 69 IN | DIASTOLIC BLOOD PRESSURE: 90 MMHG | WEIGHT: 172 LBS | OXYGEN SATURATION: 99 % | SYSTOLIC BLOOD PRESSURE: 159 MMHG | TEMPERATURE: 97.9 F

## 2023-11-02 DIAGNOSIS — I10 ESSENTIAL (PRIMARY) HYPERTENSION: ICD-10-CM

## 2023-11-02 DIAGNOSIS — Z82.49 FAMILY HISTORY OF ISCHEMIC HEART DISEASE AND OTHER DISEASES OF THE CIRCULATORY SYSTEM: ICD-10-CM

## 2023-11-02 DIAGNOSIS — K21.9 GASTRO-ESOPHAGEAL REFLUX DISEASE W/OUT ESOPHAGITIS: ICD-10-CM

## 2023-11-02 DIAGNOSIS — Z83.3 FAMILY HISTORY OF DIABETES MELLITUS: ICD-10-CM

## 2023-11-02 DIAGNOSIS — B27.80 OTHER INFECTIOUS MONONUCLEOSIS W/OUT COMPLICATION: ICD-10-CM

## 2023-11-02 DIAGNOSIS — F41.9 ANXIETY DISORDER, UNSPECIFIED: ICD-10-CM

## 2023-11-02 DIAGNOSIS — Z00.00 ENCOUNTER FOR GENERAL ADULT MEDICAL EXAMINATION W/OUT ABNORMAL FINDINGS: ICD-10-CM

## 2023-11-02 DIAGNOSIS — G47.30 SLEEP APNEA, UNSPECIFIED: ICD-10-CM

## 2023-11-02 PROCEDURE — 99205 OFFICE O/P NEW HI 60 MIN: CPT

## 2023-11-02 RX ORDER — AMLODIPINE BESYLATE 5 MG/1
TABLET ORAL
Refills: 0 | Status: ACTIVE | COMMUNITY

## 2023-11-02 RX ORDER — ATORVASTATIN CALCIUM 80 MG/1
TABLET, FILM COATED ORAL
Refills: 0 | Status: ACTIVE | COMMUNITY

## 2023-11-03 ENCOUNTER — APPOINTMENT (OUTPATIENT)
Dept: ORTHOPEDIC SURGERY | Facility: CLINIC | Age: 63
End: 2023-11-03
Payer: COMMERCIAL

## 2023-11-03 VITALS — BODY MASS INDEX: 26.81 KG/M2 | WEIGHT: 181 LBS | HEIGHT: 69 IN

## 2023-11-03 PROCEDURE — 99213 OFFICE O/P EST LOW 20 MIN: CPT

## 2023-11-20 ENCOUNTER — RESULT REVIEW (OUTPATIENT)
Age: 63
End: 2023-11-20

## 2023-11-20 ENCOUNTER — TRANSCRIPTION ENCOUNTER (OUTPATIENT)
Age: 63
End: 2023-11-20

## 2023-11-20 ENCOUNTER — OUTPATIENT (OUTPATIENT)
Dept: OUTPATIENT SERVICES | Facility: HOSPITAL | Age: 63
LOS: 1 days | Discharge: HOME | End: 2023-11-20
Payer: COMMERCIAL

## 2023-11-20 DIAGNOSIS — Z98.84 BARIATRIC SURGERY STATUS: Chronic | ICD-10-CM

## 2023-11-20 PROCEDURE — 88305 TISSUE EXAM BY PATHOLOGIST: CPT | Mod: 26

## 2023-11-20 PROCEDURE — 43239 EGD BIOPSY SINGLE/MULTIPLE: CPT

## 2023-11-20 PROCEDURE — 88305 TISSUE EXAM BY PATHOLOGIST: CPT

## 2023-11-20 NOTE — PACU DISCHARGE NOTE - HYDRATION STATUS:
Satisfactory
Normal vision: sees adequately in most situations; can see medication labels, newsprint

## 2023-11-21 LAB
SURGICAL PATHOLOGY STUDY: SIGNIFICANT CHANGE UP
SURGICAL PATHOLOGY STUDY: SIGNIFICANT CHANGE UP

## 2023-12-14 ENCOUNTER — APPOINTMENT (OUTPATIENT)
Dept: RADIOLOGY | Facility: HOSPITAL | Age: 63
End: 2023-12-14
Payer: COMMERCIAL

## 2023-12-14 ENCOUNTER — OUTPATIENT (OUTPATIENT)
Dept: OUTPATIENT SERVICES | Facility: HOSPITAL | Age: 63
LOS: 1 days | End: 2023-12-14
Payer: COMMERCIAL

## 2023-12-14 DIAGNOSIS — Z98.84 BARIATRIC SURGERY STATUS: Chronic | ICD-10-CM

## 2023-12-14 PROCEDURE — 74240 X-RAY XM UPR GI TRC 1CNTRST: CPT

## 2023-12-14 PROCEDURE — 74240 X-RAY XM UPR GI TRC 1CNTRST: CPT | Mod: 26

## 2023-12-22 ENCOUNTER — APPOINTMENT (OUTPATIENT)
Dept: ORTHOPEDIC SURGERY | Facility: CLINIC | Age: 63
End: 2023-12-22
Payer: COMMERCIAL

## 2023-12-22 DIAGNOSIS — M75.101 UNSPECIFIED ROTATOR CUFF TEAR OR RUPTURE OF RIGHT SHOULDER, NOT SPECIFIED AS TRAUMATIC: ICD-10-CM

## 2023-12-22 PROCEDURE — 99213 OFFICE O/P EST LOW 20 MIN: CPT

## 2023-12-22 NOTE — PHYSICAL EXAM
[de-identified] : POST OP SHOULDER EXAM  JUNE 13, 2023- RIGHT 3CM  ROT CUFF REPAIR WITH COLLAGEN PATCH , JACQUELINE, DEBRIDEMENT   PORTALS HEALING WELL - NO ERYTHEMA OR CALOR  AROM  160 / 150 / 80 / 20   DISTAL CMS INTACT

## 2023-12-22 NOTE — HISTORY OF PRESENT ILLNESS
[de-identified] : Last visit:11/3/2003 Reason: right shoulder pain - routine follow up Pain level: 0/10 _ feel much improved.   JUNE 13, 2023- RIGHT 3CM ROT CUFF REPAIR WITH REGENETEN

## 2023-12-22 NOTE — DISCUSSION/SUMMARY
[de-identified] : CONTINUE PT FOR STRENGTHENING   FOLLOWUP   JUNE 13, 2023- RIGHT 3CM ROT CUFF REPAIR WITH REGENETEN

## 2024-03-28 NOTE — DISCUSSION/SUMMARY
A user error has taken place: encounter opened in error, closed for administrative reasons.     [de-identified] : ULTRASOUND EVALUATION  REVEALS INFLAMMATORY CHANGES WITHOUT SIGNIFICANT TEAR \par PATIENT HAS ELECTED TO PROCEED WITH KENALOG INJECTION SHOULDER \par RISKS AND BENEFITS DISCUSSED - VERBAL CONSENT OBTAINED \par SEE PROCEDURE NOTE\par \par \par POST INJECTION INSTRUCTIONS:\par \par INJECTION THERAPY HANDOUT PROVIDED\par \par COLD THERAPY , ANALGESICS PRN\par \par START P.T.  WITHIN 2 WEEKS AFTER INJECTION - 2 X 4 WEEKS \par \par MRI IF NO RELIEF 12 WEEKS\par

## 2024-05-09 NOTE — PHYSICAL EXAM
[de-identified] : General: No acute distress, conversant, well-nourished.\par Head: Normocephalic, atraumatic\par Neck: trachea midline, FROM\par Heart: normotensive and normal rate and rhythm\par Lungs: No labored breathing\par Skin: No abrasions, no rashes, no edema\par Psych: Alert and oriented to person, place and time\par Extremities: no peripheral edema or digital cyanosis\par Gait: Normal gait. Can perform tandem gait.  \par Vascular: warm and well perfused distally, palpable distal pulses\par \par MSK:\par Left Shoulder Exam:\par Skin intact.  No ecchymosis.\par Diffusely tender to palpation over shoulder\par No tenderness over AC joint\par Pain with attempted active ROM.\par \par Left shoulder exam limited by pain.\par Unable to ER shoulder.\par Significant weakness and pain with Malia's test.\par Pain with IR.  \par \par No tenderness at bicipital groove\par Negative Speed's test\par Negative Yergson's test\par \par Sensation intact to light touch axillary, medial and lateral antebrachial cutaneous, median, radial and ulnar distributions\par +motor EPL/FPL/EDC/FDP/IO\par palpable radial pulse, WWP distally\par  [de-identified] : Left shoulder radiographs including axillary view obtained in the office today shows no acute fracture or dislocation. Glenohumeral joint is reduced.  Evidence of possible old left AC joint injury.  \par  36.8

## (undated) DEVICE — FORCEP RADIAL JAW 4 W NDL 2.2MM 2.8MM 240CM ORANGE DISP